# Patient Record
Sex: MALE | Race: BLACK OR AFRICAN AMERICAN | Employment: UNEMPLOYED | URBAN - METROPOLITAN AREA
[De-identification: names, ages, dates, MRNs, and addresses within clinical notes are randomized per-mention and may not be internally consistent; named-entity substitution may affect disease eponyms.]

---

## 2017-02-07 ENCOUNTER — OFFICE VISIT (OUTPATIENT)
Dept: FAMILY MEDICINE CLINIC | Age: 47
End: 2017-02-07

## 2017-02-07 VITALS
RESPIRATION RATE: 16 BRPM | SYSTOLIC BLOOD PRESSURE: 129 MMHG | HEIGHT: 69 IN | WEIGHT: 175.5 LBS | BODY MASS INDEX: 26 KG/M2 | HEART RATE: 81 BPM | DIASTOLIC BLOOD PRESSURE: 82 MMHG | TEMPERATURE: 97.7 F

## 2017-02-07 DIAGNOSIS — L60.2 OVERGROWN TOENAILS: ICD-10-CM

## 2017-02-07 DIAGNOSIS — M19.90 ARTHRITIS: Primary | ICD-10-CM

## 2017-02-07 DIAGNOSIS — E55.9 VITAMIN D DEFICIENCY: ICD-10-CM

## 2017-02-07 DIAGNOSIS — K40.90 LEFT INGUINAL HERNIA: ICD-10-CM

## 2017-02-07 DIAGNOSIS — R63.4 WEIGHT LOSS: ICD-10-CM

## 2017-02-07 DIAGNOSIS — H61.23 BILATERAL IMPACTED CERUMEN: ICD-10-CM

## 2017-02-07 DIAGNOSIS — Z51.81 MEDICATION MONITORING ENCOUNTER: ICD-10-CM

## 2017-02-07 DIAGNOSIS — K21.9 GASTROESOPHAGEAL REFLUX DISEASE WITHOUT ESOPHAGITIS: ICD-10-CM

## 2017-02-07 DIAGNOSIS — K42.9 UMBILICAL HERNIA WITHOUT OBSTRUCTION AND WITHOUT GANGRENE: ICD-10-CM

## 2017-02-07 RX ORDER — OMEPRAZOLE 20 MG/1
CAPSULE, DELAYED RELEASE ORAL
Qty: 30 CAP | Refills: 3 | Status: SHIPPED | OUTPATIENT
Start: 2017-02-07 | End: 2017-03-15 | Stop reason: SDUPTHER

## 2017-02-07 RX ORDER — OXYCODONE AND ACETAMINOPHEN 5; 325 MG/1; MG/1
1 TABLET ORAL
Qty: 30 TAB | Refills: 0 | Status: SHIPPED | OUTPATIENT
Start: 2017-02-07 | End: 2017-05-10 | Stop reason: SDUPTHER

## 2017-02-07 RX ORDER — OXYCODONE AND ACETAMINOPHEN 5; 325 MG/1; MG/1
1 TABLET ORAL
COMMUNITY
End: 2017-02-07 | Stop reason: SDUPTHER

## 2017-02-07 RX ORDER — ERGOCALCIFEROL 1.25 MG/1
50000 CAPSULE ORAL
Qty: 4 CAP | Refills: 3 | Status: SHIPPED | OUTPATIENT
Start: 2017-02-07 | End: 2017-03-13 | Stop reason: SDUPTHER

## 2017-02-07 NOTE — MR AVS SNAPSHOT
Visit Information Date & Time Provider Department Dept. Phone Encounter #  
 2/7/2017  9:00 AM Chante PiersonAjith 70 [phone] Follow-up Instructions Return in about 3 months (around 5/7/2017). Upcoming Health Maintenance Date Due Pneumococcal 19-64 Medium Risk (1 of 1 - PPSV23) 11/11/1991 INFLUENZA AGE 9 TO ADULT 8/1/2016 COLONOSCOPY 12/12/2024 DTaP/Tdap/Td series (2 - Td) 8/23/2026 Allergies as of 2/7/2017  Review Complete On: 2/7/2017 By: Chante Pierson MD  
  
 Severity Noted Reaction Type Reactions Protonix [Pantoprazole]  04/21/2015    Palpitations Current Immunizations  Never Reviewed No immunizations on file. Not reviewed this visit You Were Diagnosed With   
  
 Codes Comments Arthritis    -  Primary ICD-10-CM: M19.90 ICD-9-CM: 716.90 Gastroesophageal reflux disease without esophagitis     ICD-10-CM: K21.9 ICD-9-CM: 530.81 Vitamin D deficiency     ICD-10-CM: E55.9 ICD-9-CM: 268.9 Bilateral impacted cerumen     ICD-10-CM: H61.23 
ICD-9-CM: 380.4 Weight loss     ICD-10-CM: R63.4 ICD-9-CM: 783.21 Umbilical hernia without obstruction and without gangrene     ICD-10-CM: K42.9 ICD-9-CM: 553.1 Left inguinal hernia     ICD-10-CM: K40.90 ICD-9-CM: 550.90 Medication monitoring encounter     ICD-10-CM: Z51.81 
ICD-9-CM: V58.83 Vitals BP Pulse Temp Resp Height(growth percentile) Weight(growth percentile) 129/82 (BP 1 Location: Right arm, BP Patient Position: Sitting) 81 97.7 °F (36.5 °C) (Oral) 16 5' 9\" (1.753 m) 175 lb 8 oz (79.6 kg) BMI Smoking Status 25.92 kg/m2 Current Every Day Smoker BMI and BSA Data Body Mass Index Body Surface Area  
 25.92 kg/m 2 1.97 m 2 Preferred Pharmacy Pharmacy Name Phone [id number] [address] [phone] Your Updated Medication List  
  
 This list is accurate as of: 2/7/17 10:31 AM.  Always use your most recent med list.  
  
  
  
  
 desonide 0.05 % cream  
Commonly known as:  Mike Mulch Apply  to affected area two (2) times a day. ergocalciferol 50,000 unit capsule Commonly known as:  ERGOCALCIFEROL Take 1 Cap by mouth every seven (7) days. omeprazole 20 mg capsule Commonly known as:  PRILOSEC  
TAKE ONE CAPSULE BY MOUTH EVERY DAY  
  
 oxyCODONE-acetaminophen 5-325 mg per tablet Commonly known as:  PERCOCET Take 1 Tab by mouth every eight (8) hours as needed for Pain. Max Daily Amount: 3 Tabs. Prescriptions Printed Refills  
 oxyCODONE-acetaminophen (PERCOCET) 5-325 mg per tablet 0 Sig: Take 1 Tab by mouth every eight (8) hours as needed for Pain. Max Daily Amount: 3 Tabs. Class: Print Route: Oral  
  
Prescriptions Sent to Pharmacy Refills  
 omeprazole (PRILOSEC) 20 mg capsule 3 Sig: TAKE ONE CAPSULE BY MOUTH EVERY DAY Class: Normal  
 Pharmacy: [facility] [address] Ph #: [phone]  
 ergocalciferol (ERGOCALCIFEROL) 50,000 unit capsule 3 Sig: Take 1 Cap by mouth every seven (7) days. Class: Normal  
 Pharmacy: [address] [place] [address] Ph #: [phone] Route: Oral  
  
We Performed the Following REFERRAL TO ENT-OTOLARYNGOLOGY [YMR06 Custom] Comments:  
 Please evaluate patient for cerumen impaction. Follow-up Instructions Return in about 3 months (around 5/7/2017). To-Do List   
 05/07/2017 Lab:  CBC WITH AUTOMATED DIFF   
  
 05/07/2017 Lab:  MAGNESIUM   
  
 05/07/2017 Lab:  METABOLIC PANEL, COMPREHENSIVE   
  
 05/07/2017 Lab:  TSH 3RD GENERATION   
  
 05/07/2017 Lab:  VITAMIN B12   
  
 05/07/2017 Lab:  VITAMIN D, 25 HYDROXY Referral Information Referral ID Referred By Referred To [id number] [address] [place] [address] Road, [place]   
   [address] [place] [address] [address] [place], [address] Phone: [phone] Fax: [phone] Visits Status Start Date End Date 1 New Request 2/7/17 2/7/18 If your referral has a status of pending review or denied, additional information will be sent to support the outcome of this decision. Patient Instructions Please contact our office if you have any questions about your visit today. Introducing [facility]! Dear Kely Davey: Thank you for requesting a [employer] account. Our records indicate that you have previously registered for a [employer] account but its currently inactive. Please call our [employer] support line at [phone]. Additional Information If you have questions, please visit the Frequently Asked Questions section of the [employer] website at [email or web address] Remember, [employer] is NOT to be used for urgent needs. For medical emergencies, dial 911. Now available from your iPhone and Android! Please provide this summary of care documentation to your next provider. Your primary care clinician is listed as LUPE GRANT. If you have any questions after today's visit, please call [phone].

## 2017-02-07 NOTE — PROGRESS NOTES
Chief Complaint   Patient presents with    Complete Physical       Health Maintenance reviewed     1. Have you been to the ER, urgent care clinic since your last visit? Hospitalized since your last visit? No    2. Have you seen or consulted any other health care providers outside of the [address] [place] [address] since your last visit? Include any pap smears or colon screening.  No

## 2017-02-07 NOTE — PROGRESS NOTES
HISTORY OF PRESENT ILLNESS  Vicki Duvall is a 42 y.o. male. 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 Pain level 6-7/10 usually at the end of the day. complains of feeling like left ear is clogged  complains of overgrown toenails, discolored, black, not painful    HPI  Past Medical History   Diagnosis Date    GERD (gastroesophageal reflux disease)      Current Outpatient Prescriptions   Medication Sig Dispense Refill    oxyCODONE-acetaminophen (PERCOCET) 5-325 mg per tablet Take 1 Tab by mouth every eight (8) hours as needed for Pain.  omeprazole (PRILOSEC) 20 mg capsule TAKE ONE CAPSULE BY MOUTH EVERY DAY 30 Cap 3    ergocalciferol (ERGOCALCIFEROL) 50,000 unit capsule Take 1 Cap by mouth every seven (7) days. 4 Cap 3    desonide (TRIDESILON) 0.05 % cream Apply  to affected area two (2) times a day. 30 g 0     Allergies   Allergen Reactions    Protonix [Pantoprazole] Palpitations       Review of Systems   Constitutional: Negative for malaise/fatigue. HENT: Positive for ear pain. Negative for ear discharge. Gastrointestinal: Positive for heartburn. Negative for nausea and vomiting. Visit Vitals    /82 (BP 1 Location: Right arm, BP Patient Position: Sitting)    Pulse 81    Temp 97.7 °F (36.5 °C) (Oral)    Resp 16    Ht 5' 9\" (1.753 m)    Wt 175 lb 8 oz (79.6 kg)    BMI 25.92 kg/m2       Physical Exam  Nursing note and vitals reviewed. Constitutional: He is oriented to person, place, and time. He appears well-developed and well-nourished. No distress. HENT:   Mouth/Throat: Oropharynx is clear and moist.   Neck: No JVD present. No thyromegaly present.    Cardiovascular: Normal rate, regular rhythm and normal heart sounds. Pulmonary/Chest: Effort normal and breath sounds normal. No respiratory distress. He has no wheezes. He has no rales. Abdominal: Soft. Bowel sounds are normal. He exhibits no distension. There is no tenderness. There is no rebound. Musculoskeletal: He exhibits no edema and no tenderness. Lymphadenopathy:     He has no cervical adenopathy. Neurological: He is alert and oriented to person, place, and time. Skin: No pallor. Psychiatric: He has a normal mood and affect. His behavior is normal.    Results for orders placed or performed during the hospital encounter of 07/20/16   LIPID PANEL   Result Value Ref Range    LIPID PROFILE          Cholesterol, total 121 <200 MG/DL    Triglyceride 56 <150 MG/DL    HDL Cholesterol 55 40 - 60 MG/DL    LDL, calculated 54.8 0 - 100 MG/DL    VLDL, calculated 11.2 MG/DL    CHOL/HDL Ratio 2.2 0 - 5.0     METABOLIC PANEL, BASIC   Result Value Ref Range    Sodium 142 136 - 145 mmol/L    Potassium 4.5 3.5 - 5.5 mmol/L    Chloride 107 100 - 108 mmol/L    CO2 28 21 - 32 mmol/L    Anion gap 7 3.0 - 18 mmol/L    Glucose 91 74 - 99 mg/dL    BUN 9 7.0 - 18 MG/DL    Creatinine 0.91 0.6 - 1.3 MG/DL    BUN/Creatinine ratio 10 (L) 12 - 20      GFR est AA >60 >60 ml/min/1.73m2    GFR est non-AA >60 >60 ml/min/1.73m2    Calcium 8.9 8.5 - 10.1 MG/DL   VITAMIN B12   Result Value Ref Range    Vitamin B12 515 211 - 911 pg/mL   MAGNESIUM   Result Value Ref Range    Magnesium 2.3 1.8 - 2.4 mg/dL   VITAMIN D, 25 HYDROXY   Result Value Ref Range    Vitamin D 25-Hydroxy 21.1 (L) 30 - 100 ng/mL   CELIAC ANTIBODY PROFILE   Result Value Ref Range    Immunoglobulin A, Qt. 188 90 - 386 mg/dL    Deamidated Gliadin Ab, IgA 3 0 - 19 units    Deamidated Gliadin Ab, IgG 1 0 - 19 units    t-Transglutaminase, IgA <2 0 - 3 U/mL    t-Transglutaminase, IgG 2 0 - 5 U/mL       ASSESSMENT and PLAN    ICD-10-CM ICD-9-CM    1.  Arthritis M19.90 716.90 oxyCODONE-acetaminophen (PERCOCET) 5-325 mg per tablet      CBC WITH AUTOMATED DIFF      METABOLIC PANEL, COMPREHENSIVE      TSH 3RD GENERATION      MAGNESIUM   2. Gastroesophageal reflux disease without esophagitis K21.9 530.81 omeprazole (PRILOSEC) 20 mg capsule      CBC WITH AUTOMATED DIFF      METABOLIC PANEL, COMPREHENSIVE      VITAMIN B12      TSH 3RD GENERATION      MAGNESIUM   3. Vitamin D deficiency E55.9 268.9 ergocalciferol (ERGOCALCIFEROL) 50,000 unit capsule      CBC WITH AUTOMATED DIFF      METABOLIC PANEL, COMPREHENSIVE      TSH 3RD GENERATION      MAGNESIUM      VITAMIN D, 25 HYDROXY   4. Bilateral impacted cerumen H61.23 380.4 REFERRAL TO ENT-OTOLARYNGOLOGY   5. Weight loss R63.4 783.21 CBC WITH AUTOMATED DIFF      METABOLIC PANEL, COMPREHENSIVE      TSH 3RD GENERATION      MAGNESIUM   6. Umbilical hernia without obstruction and without gangrene K42.9 553.1    7. Left inguinal hernia K40.90 550.90    8. Overgrown toenails L60.2 703.8 REFERRAL TO PODIATRY   9. Medication monitoring encounter Z51.81 V58.83 CBC WITH AUTOMATED DIFF      METABOLIC PANEL, COMPREHENSIVE      VITAMIN B12      TSH 3RD GENERATION      MAGNESIUM      VITAMIN D, 25 HYDROXY   Follow-up Disposition:  Return in about 3 months (around 5/7/2017).

## 2017-02-07 NOTE — PROGRESS NOTES
Subjective:   Patricia Villareal is a 42 y.o. male presenting for his annual checkup. ROS:  Feeling well. No dyspnea or chest pain on exertion. No abdominal pain, change in bowel habits, black or bloody stools. No urinary tract or prostatic symptoms. No neurological complaints. Specific concerns today: ***. Current Outpatient Prescriptions   Medication Sig Dispense Refill    oxyCODONE-acetaminophen (PERCOCET) 5-325 mg per tablet Take 1 Tab by mouth every eight (8) hours as needed for Pain.  omeprazole (PRILOSEC) 20 mg capsule TAKE ONE CAPSULE BY MOUTH EVERY DAY 30 Cap 3    ergocalciferol (ERGOCALCIFEROL) 50,000 unit capsule Take 1 Cap by mouth every seven (7) days. 4 Cap 3    desonide (TRIDESILON) 0.05 % cream Apply  to affected area two (2) times a day. 30 g 0     Allergies   Allergen Reactions    Protonix [Pantoprazole] Palpitations     Past Medical History   Diagnosis Date    GERD (gastroesophageal reflux disease)      Past Surgical History   Procedure Laterality Date    Hx hernia repair Left      Family History   Problem Relation Age of Onset    Diabetes Mother     Blindness Sister     Other Sister      menningitis     Social History   Substance Use Topics    Smoking status: Current Every Day Smoker     Packs/day: 0.50     Years: 20.00    Smokeless tobacco: Never Used    Alcohol use No             Objective:     Visit Vitals    /82 (BP 1 Location: Right arm, BP Patient Position: Sitting)    Pulse 81    Temp 97.7 °F (36.5 °C) (Oral)    Resp 16    Ht 5' 9\" (1.753 m)    Wt 175 lb 8 oz (79.6 kg)    BMI 25.92 kg/m2     The patient appears well, alert, oriented x 3, in no distress. ENT normal.  Neck supple. No adenopathy or thyromegaly. MOHSEN. Lungs are clear, good air entry, no wheezes, rhonchi or rales. S1 and S2 normal, no murmurs, regular rate and rhythm. Abdomen is soft without tenderness, guarding, mass or organomegaly.   exam: {pe male genitalia:386916::\"no penile lesions or discharge, no testicular masses or tenderness, no hernias\"}. Extremities show no edema, normal peripheral pulses. Neurological is normal without focal findings. Assessment/Plan:   healthy adult male  {disease follow up plans:750709}. {Assessment and Plan:22329}.

## 2017-03-13 DIAGNOSIS — E55.9 VITAMIN D DEFICIENCY: ICD-10-CM

## 2017-03-13 RX ORDER — ERGOCALCIFEROL 1.25 MG/1
50000 CAPSULE ORAL
Qty: 12 CAP | Refills: 1 | Status: SHIPPED | OUTPATIENT
Start: 2017-03-13 | End: 2017-09-26 | Stop reason: SDUPTHER

## 2017-03-15 DIAGNOSIS — K21.9 GASTROESOPHAGEAL REFLUX DISEASE WITHOUT ESOPHAGITIS: ICD-10-CM

## 2017-03-16 RX ORDER — OMEPRAZOLE 20 MG/1
CAPSULE, DELAYED RELEASE ORAL
Qty: 90 CAP | Refills: 3 | Status: SHIPPED | OUTPATIENT
Start: 2017-03-16 | End: 2017-09-26 | Stop reason: SDUPTHER

## 2017-05-09 ENCOUNTER — OFFICE VISIT (OUTPATIENT)
Dept: FAMILY MEDICINE CLINIC | Age: 47
End: 2017-05-09

## 2017-05-09 VITALS
DIASTOLIC BLOOD PRESSURE: 87 MMHG | HEIGHT: 69 IN | TEMPERATURE: 97.9 F | BODY MASS INDEX: 26.36 KG/M2 | OXYGEN SATURATION: 99 % | HEART RATE: 69 BPM | SYSTOLIC BLOOD PRESSURE: 147 MMHG | WEIGHT: 178 LBS

## 2017-05-09 DIAGNOSIS — M19.90 ARTHRITIS: ICD-10-CM

## 2017-05-09 DIAGNOSIS — R51.9 NONINTRACTABLE HEADACHE, UNSPECIFIED CHRONICITY PATTERN, UNSPECIFIED HEADACHE TYPE: ICD-10-CM

## 2017-05-09 DIAGNOSIS — E55.9 VITAMIN D DEFICIENCY: ICD-10-CM

## 2017-05-09 DIAGNOSIS — R03.0 ELEVATED BLOOD PRESSURE READING: ICD-10-CM

## 2017-05-09 DIAGNOSIS — K21.9 GASTROESOPHAGEAL REFLUX DISEASE, ESOPHAGITIS PRESENCE NOT SPECIFIED: Primary | ICD-10-CM

## 2017-05-09 RX ORDER — OXYCODONE AND ACETAMINOPHEN 5; 325 MG/1; MG/1
1 TABLET ORAL
Qty: 30 TAB | Refills: 0 | Status: CANCELLED | OUTPATIENT
Start: 2017-05-09

## 2017-05-09 NOTE — TELEPHONE ENCOUNTER
Pt was seen today and had requested a refill on Percocet, but he did not get a prescription for this.

## 2017-05-09 NOTE — MR AVS SNAPSHOT
Visit Information Date & Time Provider Department Dept. Phone Encounter #  
 5/9/2017 11:30 AM Dayo Landin NP [address] [id number] Follow-up Instructions Return in about 1 day (around 5/10/2017), or if symptoms worsen or fail to improve. Upcoming Health Maintenance Date Due Pneumococcal 19-64 Medium Risk (1 of 1 - PPSV23) 11/11/1991 INFLUENZA AGE 9 TO ADULT 8/1/2017 COLONOSCOPY 12/12/2024 DTaP/Tdap/Td series (2 - Td) 8/23/2026 Allergies as of 5/9/2017  Review Complete On: 5/9/2017 By: Reji Boss LPN Severity Noted Reaction Type Reactions Protonix [Pantoprazole]  04/21/2015    Palpitations Current Immunizations  Never Reviewed No immunizations on file. Not reviewed this visit You Were Diagnosed With   
  
 Codes Comments Gastroesophageal reflux disease, esophagitis presence not specified    -  Primary ICD-10-CM: K21.9 ICD-9-CM: 530.81 Vitamin D deficiency     ICD-10-CM: E55.9 ICD-9-CM: 268.9 Elevated blood pressure reading     ICD-10-CM: R03.0 ICD-9-CM: 796.2 Nonintractable headache, unspecified chronicity pattern, unspecified headache type     ICD-10-CM: R51 ICD-9-CM: 567. 0 Vitals BP Pulse Temp Height(growth percentile) Weight(growth percentile) SpO2  
 147/87 (BP 1 Location: Right arm, BP Patient Position: Sitting) 69 97.9 °F (36.6 °C) (Oral) 5' 9\" (1.753 m) 178 lb (80.7 kg) 99% BMI Smoking Status 26.29 kg/m2 Current Every Day Smoker BMI and BSA Data Body Mass Index Body Surface Area  
 26.29 kg/m 2 1.98 m 2 Preferred Pharmacy Pharmacy Name Phone [phone] [phone] [address] [phone] Your Updated Medication List  
  
   
This list is accurate as of: 5/9/17 12:14 PM.  Always use your most recent med list.  
  
  
  
  
 desonide 0.05 % cream  
Commonly known as:  Yonathan Mota  
 Apply  to affected area two (2) times a day. ergocalciferol 50,000 unit capsule Commonly known as:  ERGOCALCIFEROL Take 1 Cap by mouth every seven (7) days. omeprazole 20 mg capsule Commonly known as:  PRILOSEC  
TAKE ONE CAPSULE BY MOUTH EVERY DAY  
  
 oxyCODONE-acetaminophen 5-325 mg per tablet Commonly known as:  PERCOCET Take 1 Tab by mouth every eight (8) hours as needed for Pain. Max Daily Amount: 3 Tabs. Follow-up Instructions Return in about 1 day (around 5/10/2017), or if symptoms worsen or fail to improve. Introducing [facility]! Dear Main Chavez: Thank you for requesting a [employer] account. Our records indicate that you have previously registered for a [employer] account but its currently inactive. Please call our [employer] support line at [phone]. Additional Information If you have questions, please visit the Frequently Asked Questions section of the [employer] website at [email or web address] Remember, [employer] is NOT to be used for urgent needs. For medical emergencies, dial 911. Now available from your iPhone and Android! Please provide this summary of care documentation to your next provider. Your primary care clinician is listed as LUPE GRANT. If you have any questions after today's visit, please call [phone].

## 2017-05-09 NOTE — PROGRESS NOTES
1. Have you been to the ER, urgent care clinic since your last visit? Hospitalized since your last visit? No    2. Have you seen or consulted any other health care providers outside of the [facility] since your last visit? Include any pap smears or colon screening.  No    Is someone accompanying this pt? no    Is the patient using any DME equipment during OV? no      Chief Complaint   Patient presents with    GERD    Vitamin D Deficiency

## 2017-05-09 NOTE — PROGRESS NOTES
MYLES Ramsey is a 42 y.o. male  Chief Complaint   Patient presents with    GERD    Vitamin D Deficiency     Reports taking medication for GERD and it is effective. Denies issues at this time with GERD. Reports needing to have his labs completed for vitamin D deficiency. Reports taking vitamin D once weekly as previously prescribed by Dr. Rosalia Meza. Reports mild headache. 1-2/10 dull headache. Admits to smoking just prior to office visit. Past Medical History  Past Medical History:   Diagnosis Date    GERD (gastroesophageal reflux disease)        Surgical History  Past Surgical History:   Procedure Laterality Date    HX HERNIA REPAIR Left         Medications  Current Outpatient Prescriptions   Medication Sig Dispense Refill    omeprazole (PRILOSEC) 20 mg capsule TAKE ONE CAPSULE BY MOUTH EVERY DAY 90 Cap 3    ergocalciferol (ERGOCALCIFEROL) 50,000 unit capsule Take 1 Cap by mouth every seven (7) days. 12 Cap 1    oxyCODONE-acetaminophen (PERCOCET) 5-325 mg per tablet Take 1 Tab by mouth every eight (8) hours as needed for Pain. Max Daily Amount: 3 Tabs. 30 Tab 0    desonide (TRIDESILON) 0.05 % cream Apply  to affected area two (2) times a day. 30 g 0       Allergies  Allergies   Allergen Reactions    Protonix [Pantoprazole] Palpitations       Family History  Family History   Problem Relation Age of Onset    Diabetes Mother    [facility] Blindness Sister     Other Sister      menningitis       Social History  Social History     Social History    Marital status: SINGLE     Spouse name: N/A    Number of children: N/A    Years of education: N/A     Occupational History    Not on file.      Social History Main Topics    Smoking status: Current Every Day Smoker     Packs/day: 0.50     Years: 20.00    Smokeless tobacco: Never Used    Alcohol use No    Drug use: No    Sexual activity: Yes     Partners: Female     Other Topics Concern    Not on file     Social History Narrative       Problem List  Patient Active Problem List   Diagnosis Code    Gastroesophageal reflux disease without esophagitis K21.9    Left inguinal hernia U41.27    Umbilical hernia without obstruction and without gangrene K42.9    Vitamin D deficiency E55.9       Review of Systems  Review of Systems   Constitutional: Negative for chills and fever. Respiratory: Negative for shortness of breath. Cardiovascular: Negative for chest pain and palpitations. Gastrointestinal: Negative for abdominal pain, diarrhea, nausea and vomiting. Musculoskeletal: Negative for falls, joint pain and myalgias. Neurological: Positive for headaches. Vital Signs  Vitals:    05/09/17 1142   BP: 147/87   Pulse: 69   Temp: 97.9 °F (36.6 °C)   TempSrc: Oral   SpO2: 99%   Weight: 178 lb (80.7 kg)   Height: 5' 9\" (1.753 m)   PainSc:   0 - No pain       Physical Exam  Physical Exam   Constitutional: He is oriented to person, place, and time. Cardiovascular: Normal rate, regular rhythm and normal heart sounds. Exam reveals no gallop and no friction rub. No murmur heard. Pulmonary/Chest: Effort normal and breath sounds normal. No respiratory distress. He has no wheezes. He has no rales. Neurological: He is alert and oriented to person, place, and time. Skin: Skin is warm and dry. Psychiatric: He has a normal mood and affect. His behavior is normal.       Diagnostics  No orders of the defined types were placed in this encounter.       Results  Results for orders placed or performed during the hospital encounter of 07/20/16   LIPID PANEL   Result Value Ref Range    LIPID PROFILE          Cholesterol, total 121 <200 MG/DL    Triglyceride 56 <150 MG/DL    HDL Cholesterol 55 40 - 60 MG/DL    LDL, calculated 54.8 0 - 100 MG/DL    VLDL, calculated 11.2 MG/DL    CHOL/HDL Ratio 2.2 0 - 5.0     METABOLIC PANEL, BASIC   Result Value Ref Range    Sodium 142 136 - 145 mmol/L    Potassium 4.5 3.5 - 5.5 mmol/L    Chloride 107 100 - 108 mmol/L    CO2 28 21 - 32 mmol/L    Anion gap 7 3.0 - 18 mmol/L    Glucose 91 74 - 99 mg/dL    BUN 9 7.0 - 18 MG/DL    Creatinine 0.91 0.6 - 1.3 MG/DL    BUN/Creatinine ratio 10 (L) 12 - 20      GFR est AA >60 >60 ml/min/1.73m2    GFR est non-AA >60 >60 ml/min/1.73m2    Calcium 8.9 8.5 - 10.1 MG/DL   VITAMIN B12   Result Value Ref Range    Vitamin B12 515 211 - 911 pg/mL   MAGNESIUM   Result Value Ref Range    Magnesium 2.3 1.8 - 2.4 mg/dL   VITAMIN D, 25 HYDROXY   Result Value Ref Range    Vitamin D 25-Hydroxy 21.1 (L) 30 - 100 ng/mL   CELIAC ANTIBODY PROFILE   Result Value Ref Range    Immunoglobulin A, Qt. 188 90 - 386 mg/dL    Deamidated Gliadin Ab, IgA 3 0 - 19 units    Deamidated Gliadin Ab, IgG 1 0 - 19 units    t-Transglutaminase, IgA <2 0 - 3 U/mL    t-Transglutaminase, IgG 2 0 - 5 U/mL         Assessment and Plan  Carolann Jimenez was seen today for gerd and vitamin d deficiency. Diagnoses and all orders for this visit:    Gastroesophageal reflux disease, esophagitis presence not specified    Vitamin D deficiency    Elevated blood pressure reading    Nonintractable headache, unspecified chronicity pattern, unspecified headache type    Other orders  -     Cancel: oxyCODONE-acetaminophen (PERCOCET) 5-325 mg per tablet; Take 1 Tab by mouth every eight (8) hours as needed for Pain. Max Daily Amount: 3 Tabs. Discussed importance of smoking cessation and the impact on blood pressure. Patient to return for fasting labs and then to follow up with Dr. Tristen Patel  Patient to continue taking Vitamin D as prescribed. Patient to take OTC acetaminophen for headache  After care summary printed and reviewed with patient. Plan reviewed with patient. Questions answered. Patient verbalized understanding of plan and is in agreement with plan. Patient to follow up in one week or earlier if symptoms worsen for lab results and blood pressure check with PCP.      LUIS E Montanez

## 2017-05-10 ENCOUNTER — DOCUMENTATION ONLY (OUTPATIENT)
Dept: FAMILY MEDICINE CLINIC | Age: 47
End: 2017-05-10

## 2017-05-10 ENCOUNTER — HOSPITAL ENCOUNTER (OUTPATIENT)
Dept: LAB | Age: 47
Discharge: HOME | End: 2017-05-10
Payer: COMMERCIAL

## 2017-05-10 DIAGNOSIS — R63.4 WEIGHT LOSS: ICD-10-CM

## 2017-05-10 DIAGNOSIS — E55.9 VITAMIN D DEFICIENCY: ICD-10-CM

## 2017-05-10 DIAGNOSIS — K21.9 GASTROESOPHAGEAL REFLUX DISEASE WITHOUT ESOPHAGITIS: ICD-10-CM

## 2017-05-10 DIAGNOSIS — Z51.81 MEDICATION MONITORING ENCOUNTER: ICD-10-CM

## 2017-05-10 DIAGNOSIS — M19.90 ARTHRITIS: ICD-10-CM

## 2017-05-10 LAB
ALBUMIN SERPL BCP-MCNC: 3.7 G/DL (ref 3.4–5)
ALBUMIN/GLOB SERPL: 1 {RATIO} (ref 0.8–1.7)
ALP SERPL-CCNC: 99 U/L (ref 45–117)
ALT SERPL-CCNC: 18 U/L (ref 16–61)
ANION GAP BLD CALC-SCNC: 9 MMOL/L (ref 3–18)
AST SERPL W P-5'-P-CCNC: 13 U/L (ref 15–37)
BASOPHILS # BLD AUTO: 0 K/UL (ref 0–0.06)
BASOPHILS # BLD: 1 % (ref 0–2)
BILIRUB SERPL-MCNC: 0.3 MG/DL (ref 0.2–1)
BUN SERPL-MCNC: 10 MG/DL (ref 7–18)
BUN/CREAT SERPL: 11 (ref 12–20)
CALCIUM SERPL-MCNC: 8.8 MG/DL (ref 8.5–10.1)
CHLORIDE SERPL-SCNC: 104 MMOL/L (ref 100–108)
CO2 SERPL-SCNC: 28 MMOL/L (ref 21–32)
CREAT SERPL-MCNC: 0.95 MG/DL (ref 0.6–1.3)
DIFFERENTIAL METHOD BLD: ABNORMAL
EOSINOPHIL # BLD: 0.5 K/UL (ref 0–0.4)
EOSINOPHIL NFR BLD: 9 % (ref 0–5)
ERYTHROCYTE [DISTWIDTH] IN BLOOD BY AUTOMATED COUNT: 12.7 % (ref 11.6–14.5)
GLOBULIN SER CALC-MCNC: 3.6 G/DL (ref 2–4)
GLUCOSE SERPL-MCNC: 95 MG/DL (ref 74–99)
HCT VFR BLD AUTO: 44.3 % (ref 36–48)
HGB BLD-MCNC: 14.4 G/DL (ref 13–16)
LYMPHOCYTES # BLD AUTO: 45 % (ref 21–52)
LYMPHOCYTES # BLD: 2.6 K/UL (ref 0.9–3.6)
MAGNESIUM SERPL-MCNC: 2.2 MG/DL (ref 1.6–2.6)
MCH RBC QN AUTO: 30.7 PG (ref 24–34)
MCHC RBC AUTO-ENTMCNC: 32.5 G/DL (ref 31–37)
MCV RBC AUTO: 94.5 FL (ref 74–97)
MONOCYTES # BLD: 0.5 K/UL (ref 0.05–1.2)
MONOCYTES NFR BLD AUTO: 9 % (ref 3–10)
NEUTS SEG # BLD: 2.1 K/UL (ref 1.8–8)
NEUTS SEG NFR BLD AUTO: 36 % (ref 40–73)
PLATELET # BLD AUTO: 251 K/UL (ref 135–420)
PMV BLD AUTO: 11.3 FL (ref 9.2–11.8)
POTASSIUM SERPL-SCNC: 4.6 MMOL/L (ref 3.5–5.5)
PROT SERPL-MCNC: 7.3 G/DL (ref 6.4–8.2)
RBC # BLD AUTO: 4.69 M/UL (ref 4.7–5.5)
SODIUM SERPL-SCNC: 141 MMOL/L (ref 136–145)
TSH SERPL DL<=0.05 MIU/L-ACNC: 1.98 UIU/ML (ref 0.36–3.74)
VIT B12 SERPL-MCNC: 572 PG/ML (ref 211–911)
WBC # BLD AUTO: 5.8 K/UL (ref 4.6–13.2)

## 2017-05-10 PROCEDURE — 80053 COMPREHEN METABOLIC PANEL: CPT | Performed by: FAMILY MEDICINE

## 2017-05-10 PROCEDURE — 82607 VITAMIN B-12: CPT | Performed by: FAMILY MEDICINE

## 2017-05-10 PROCEDURE — 85025 COMPLETE CBC W/AUTO DIFF WBC: CPT | Performed by: FAMILY MEDICINE

## 2017-05-10 PROCEDURE — 84443 ASSAY THYROID STIM HORMONE: CPT | Performed by: FAMILY MEDICINE

## 2017-05-10 PROCEDURE — 36415 COLL VENOUS BLD VENIPUNCTURE: CPT | Performed by: FAMILY MEDICINE

## 2017-05-10 PROCEDURE — 83735 ASSAY OF MAGNESIUM: CPT | Performed by: FAMILY MEDICINE

## 2017-05-10 PROCEDURE — 82306 VITAMIN D 25 HYDROXY: CPT | Performed by: FAMILY MEDICINE

## 2017-05-10 RX ORDER — OXYCODONE AND ACETAMINOPHEN 5; 325 MG/1; MG/1
1 TABLET ORAL
Qty: 30 TAB | Refills: 0 | Status: SHIPPED | OUTPATIENT
Start: 2017-05-10 | End: 2017-09-26 | Stop reason: SDUPTHER

## 2017-05-11 LAB — 25(OH)D3 SERPL-MCNC: 36.4 NG/ML (ref 30–100)

## 2017-09-26 ENCOUNTER — OFFICE VISIT (OUTPATIENT)
Dept: FAMILY MEDICINE CLINIC | Age: 47
End: 2017-09-26

## 2017-09-26 ENCOUNTER — HOSPITAL ENCOUNTER (OUTPATIENT)
Dept: LAB | Age: 47
Discharge: HOME | End: 2017-09-26
Payer: COMMERCIAL

## 2017-09-26 VITALS
BODY MASS INDEX: 27.11 KG/M2 | TEMPERATURE: 97.8 F | HEIGHT: 69 IN | RESPIRATION RATE: 20 BRPM | SYSTOLIC BLOOD PRESSURE: 129 MMHG | HEART RATE: 73 BPM | WEIGHT: 183 LBS | DIASTOLIC BLOOD PRESSURE: 82 MMHG

## 2017-09-26 DIAGNOSIS — K21.9 GASTROESOPHAGEAL REFLUX DISEASE WITHOUT ESOPHAGITIS: ICD-10-CM

## 2017-09-26 DIAGNOSIS — M19.90 ARTHRITIS: ICD-10-CM

## 2017-09-26 DIAGNOSIS — R22.31 NODULE OF FINGER, RIGHT: ICD-10-CM

## 2017-09-26 DIAGNOSIS — G89.4 CHRONIC PAIN SYNDROME: Primary | ICD-10-CM

## 2017-09-26 DIAGNOSIS — R20.0 NUMBNESS OF FINGERS: ICD-10-CM

## 2017-09-26 DIAGNOSIS — E55.9 VITAMIN D DEFICIENCY: ICD-10-CM

## 2017-09-26 DIAGNOSIS — Z51.81 ENCOUNTER FOR MONITORING OPIOID MAINTENANCE THERAPY: ICD-10-CM

## 2017-09-26 DIAGNOSIS — Z79.891 ENCOUNTER FOR MONITORING OPIOID MAINTENANCE THERAPY: ICD-10-CM

## 2017-09-26 PROCEDURE — 80307 DRUG TEST PRSMV CHEM ANLYZR: CPT | Performed by: FAMILY MEDICINE

## 2017-09-26 RX ORDER — ERGOCALCIFEROL 1.25 MG/1
50000 CAPSULE ORAL
Qty: 12 CAP | Refills: 1 | Status: SHIPPED | OUTPATIENT
Start: 2017-09-26 | End: 2019-09-25 | Stop reason: SDUPTHER

## 2017-09-26 RX ORDER — OMEPRAZOLE 20 MG/1
CAPSULE, DELAYED RELEASE ORAL
Qty: 90 CAP | Refills: 3 | Status: SHIPPED | OUTPATIENT
Start: 2017-09-26 | End: 2019-09-23 | Stop reason: SDUPTHER

## 2017-09-26 RX ORDER — OXYCODONE AND ACETAMINOPHEN 5; 325 MG/1; MG/1
1 TABLET ORAL
Qty: 30 TAB | Refills: 0 | Status: SHIPPED | OUTPATIENT
Start: 2017-09-26 | End: 2019-09-20

## 2017-09-26 NOTE — PROGRESS NOTES
Chief Complaint   Patient presents with    GERD    Vitamin D Deficiency    Elevated Blood Pressure       Health Maintenance Due   Topic Date Due    Pneumococcal 19-64 Medium Risk (1 of 1 - PPSV23) 11/11/1991    INFLUENZA AGE 9 TO ADULT  08/01/2017       Health Maintenance reviewed     1. Have you been to the ER, urgent care clinic since your last visit? Hospitalized since your last visit? No    2. Have you seen or consulted any other health care providers outside of the [address] [place] [address] since your last visit? Include any pap smears or colon screening.  No

## 2017-09-26 NOTE — PROGRESS NOTES
HISTORY OF PRESENT ILLNESS  Caron Lauren is a 42 y.o. male. Chief Complaint   Patient presents with    GERD chronic problem, stable improved with med    Vitamin D Deficiency    Elevated Blood Pressure    Numbness     right hand, off and on, new problem worse with playing games or laptop   Requesting med for chronic arthralgias and pain in hands back etc    HPI  Past Medical History:   Diagnosis Date    GERD (gastroesophageal reflux disease)      Current Outpatient Prescriptions   Medication Sig Dispense Refill    oxyCODONE-acetaminophen (PERCOCET) 5-325 mg per tablet Take 1 Tab by mouth every eight (8) hours as needed for Pain. Max Daily Amount: 3 Tabs. 30 Tab 0    omeprazole (PRILOSEC) 20 mg capsule TAKE ONE CAPSULE BY MOUTH EVERY DAY 90 Cap 3    ergocalciferol (ERGOCALCIFEROL) 50,000 unit capsule Take 1 Cap by mouth every seven (7) days. 12 Cap 1     Allergies   Allergen Reactions    Protonix [Pantoprazole] Palpitations       Review of Systems   Gastrointestinal: Positive for heartburn. Neurological: Positive for tingling. Negative for tremors and focal weakness. Visit Vitals    /82 (BP 1 Location: Right arm, BP Patient Position: Sitting)    Pulse 73    Temp 97.8 °F (36.6 °C) (Oral)    Resp 20    Ht 5' 9\" (1.753 m)    Wt 183 lb (83 kg)    BMI 27.02 kg/m2       Physical Exam  Nursing note and vitals reviewed. Constitutional: He is oriented to person, place, and time. He appears well-developed and well-nourished. No distress. HENT:   Mouth/Throat: Oropharynx is clear and moist.   Neck: No JVD present. No thyromegaly present. Cardiovascular: Normal rate, regular rhythm and normal heart sounds. Pulmonary/Chest: Effort normal and breath sounds normal. No respiratory distress. He has no wheezes. He has no rales. Abdominal: Soft. Bowel sounds are normal. He exhibits no distension. There is no tenderness. There is no rebound.    Musculoskeletal: He exhibits no edema and no tenderness. Lymphadenopathy:     He has no cervical adenopathy. Neurological: He is alert and oriented to person, place, and time. Skin: No pallor. Psychiatric: He has a normal mood and affect. His behavior is normal.     ASSESSMENT and PLAN    ICD-10-CM ICD-9-CM    1. Chronic pain syndrome Reviewed  database profile, no unexpected activity. Signed narcotic contract on chart. Patient remains compliant with med, refilled today. G89.4 338.4 COMPLIANCE DRUG SCREEN/PRESCRIPTION MONITORING      COMPLIANCE DRUG SCREEN/PRESCRIPTION MONITORING   2. Arthritis M19.90 716.90 oxyCODONE-acetaminophen (PERCOCET) 5-325 mg per tablet      COMPLIANCE DRUG SCREEN/PRESCRIPTION MONITORING      COMPLIANCE DRUG SCREEN/PRESCRIPTION MONITORING   3. Gastroesophageal reflux disease without esophagitis K21.9 530.81 omeprazole (PRILOSEC) 20 mg capsule   4. Vitamin D deficiency E55.9 268.9 ergocalciferol (ERGOCALCIFEROL) 50,000 unit capsule   5. Nodule of finger, right R22.31 782. 2 XR HAND RT AP/LAT   6. Numbness of fingers R20.0 782.0 REFERRAL TO NEUROLOGY   7. Encounter for monitoring opioid maintenance therapy Z51.81 V58.83 COMPLIANCE DRUG SCREEN/PRESCRIPTION MONITORING    Z79.891 V58.69 COMPLIANCE DRUG SCREEN/PRESCRIPTION MONITORING   . Visit based of time 45 minutes total,  with more than 50% of the face-to-face visit time spent in counseling on chronic issues above NEW PAIN CONTRACT, NSAIDS AVOIDED DUE TO GERD, new pain in fingers and likely overuse, it's treatment, prognosis, management advise, plan and follow-up recommendations.

## 2017-09-26 NOTE — MR AVS SNAPSHOT
Visit Information Date & Time Provider Department Dept. Phone Encounter #  
 9/26/2017 10:30 AM Kyle Priest MD [id number] [id number] Follow-up Instructions Return in about 2 months (around 11/26/2017). Upcoming Health Maintenance Date Due Pneumococcal 19-64 Medium Risk (1 of 1 - PPSV23) 11/11/1991 COLONOSCOPY 12/12/2024 DTaP/Tdap/Td series (2 - Td) 8/23/2026 Allergies as of 9/26/2017  Review Complete On: 9/26/2017 By: Kyle Priest MD  
  
 Severity Noted Reaction Type Reactions Protonix [Pantoprazole]  04/21/2015    Palpitations Current Immunizations  Never Reviewed No immunizations on file. Not reviewed this visit You Were Diagnosed With   
  
 Codes Comments Chronic pain syndrome    -  Primary ICD-10-CM: G89.4 ICD-9-CM: 338.4 Arthritis     ICD-10-CM: M19.90 ICD-9-CM: 716.90 Gastroesophageal reflux disease without esophagitis     ICD-10-CM: K21.9 ICD-9-CM: 530.81 Vitamin D deficiency     ICD-10-CM: E55.9 ICD-9-CM: 268.9 Nodule of finger, right     ICD-10-CM: R22.31 
ICD-9-CM: 782.2 Numbness of fingers     ICD-10-CM: R20.0 ICD-9-CM: 782.0 Encounter for monitoring opioid maintenance therapy     ICD-10-CM: Z51.81, [id number] ICD-9-CM: V58.83, V58.69 Vitals BP Pulse Temp Resp Height(growth percentile) Weight(growth percentile) 129/82 (BP 1 Location: Right arm, BP Patient Position: Sitting) 73 97.8 °F (36.6 °C) (Oral) 20 5' 9\" (1.753 m) 183 lb (83 kg) BMI Smoking Status 27.02 kg/m2 Current Every Day Smoker BMI and BSA Data Body Mass Index Body Surface Area  
 27.02 kg/m 2 2.01 m 2 Preferred Pharmacy Pharmacy Name Phone [id number] [address] [phone] Your Updated Medication List  
  
   
This list is accurate as of: 9/26/17 12:11 PM.  Always use your most recent med list.  
  
  
  
 ergocalciferol 50,000 unit capsule Commonly known as:  ERGOCALCIFEROL Take 1 Cap by mouth every seven (7) days. omeprazole 20 mg capsule Commonly known as:  PRILOSEC  
TAKE ONE CAPSULE BY MOUTH EVERY DAY  
  
 oxyCODONE-acetaminophen 5-325 mg per tablet Commonly known as:  PERCOCET Take 1 Tab by mouth every eight (8) hours as needed for Pain. Max Daily Amount: 3 Tabs. Prescriptions Printed Refills  
 oxyCODONE-acetaminophen (PERCOCET) 5-325 mg per tablet 0 Sig: Take 1 Tab by mouth every eight (8) hours as needed for Pain. Max Daily Amount: 3 Tabs. Class: Print Route: Oral  
  
Prescriptions Sent to Pharmacy Refills  
 omeprazole (PRILOSEC) 20 mg capsule 3 Sig: TAKE ONE CAPSULE BY MOUTH EVERY DAY Class: Normal  
 Pharmacy: [facility] [address] Ph #: [phone]  
 ergocalciferol (ERGOCALCIFEROL) 50,000 unit capsule 1 Sig: Take 1 Cap by mouth every seven (7) days. Class: Normal  
 Pharmacy: [address] [place] [address] Ph #: [phone] Route: Oral  
  
We Performed the Following REFERRAL TO NEUROLOGY [ADV16 Custom] Follow-up Instructions Return in about 2 months (around 11/26/2017). To-Do List   
 09/26/2017 Lab:  COMPLIANCE DRUG SCREEN/PRESCRIPTION MONITORING   
  
 09/26/2017 Imaging:  XR HAND RT AP/LAT Referral Information Referral ID Referred By Referred To  
  
 [id number] Libraod GRANT MD   
   [address] [place] [address] [address] Phone: [phone] Fax: [phone] Visits Status Start Date End Date 1 New Request 9/26/17 9/26/18 If your referral has a status of pending review or denied, additional information will be sent to support the outcome of this decision. Patient Instructions Please contact our office if you have any questions about your visit today. Introducing [facility]! Dear Diamond Mcginnis: Thank you for requesting a [employer] account. Our records indicate that you have previously registered for a [employer] account but its currently inactive. Please call our [employer] support line at [phone]. Additional Information If you have questions, please visit the Frequently Asked Questions section of the [employer] website at [email or web address] Remember, [employer] is NOT to be used for urgent needs. For medical emergencies, dial 911. Now available from your iPhone and Android! Please provide this summary of care documentation to your next provider. Your primary care clinician is listed as LUPE GRANT. If you have any questions after today's visit, please call [phone].

## 2017-09-26 NOTE — LETTER
Name:Ana Paula Thomas OBO:[birth year] MR #:[id number] Provider Janette Padilla MD  
*[id number]* BSMG-491 (5/16) Page 1 of 5 Initial [employer] CONTROLLED SUBSTANCE AGREEMENT I may be prescribed medications that are controlled substances as part  of my treatment plan for management of my medical condition(s). The goal of my treatment plan is to maintain and/or improve my health and wellbeing. Because controlled substances have an increased risk of abuse or harm, continual re-evaluation is needed determine if the goals of my treatment plan are being met for my safety and the safety of others. Alexis Joseph  am entering into this Controlled Substance Agreement with my provider, Gamal Kang MD at [place] [address] . I understand that successful treatment requires mutual trust and honesty between me and my provider. I understand that there are state and federal laws and regulations which apply to the medications that my provider may prescribe that must be followed. I understand there are risks and benefits ts of taking the medicines that my provider may prescribe. I understand and agree that following this Agreement is necessary in continuing my provider-patient relationship and success of my treatment plan. As a part of my treatment plan, I agree to the following: COMMUNICATION: 
 
1. I will communicate fully with my provider about my medical condition(s), including the effect on my daily life and how well my medications are helping. I will tell my provider all of the medications that I take for any reason, including medications I receive from another health care provider, and will notify my provider about all issues, problems or concerns, including any side effects, which may be related to my medications. I understand that this information allows my provider to adjust my treatment plan to help manage my medical condition.  I understand that this information will become part of my permanent medical record. 2. I will notify my provider if I have a history of alcohol/drug misuse/addiction or if I have had treatment for alcohol/drug addiction in the past, or if I have a new problem with or concern about alcohol/drug use/addiction, because this increases the likelihood of high risk behaviors and may lead to serious medical conditions. 3. Females Only: I will notify my provider if I am or become pregnant, or if I intend to become pregnant, or if I intend to breastfeed. I understand that communication of these issues with my provider is important, due to possible effects my medication could have on an unborn fetus or breastfeeding child. Name:. Stalin Patient XIF:[id number] MR #:[id number] Provider Cosat Shields MD  
*[id number]* BSMG-491 (5/16) Page 2 of 5 Initial [employer] MISUSE OF MEDICATIONS / DRUGS: 
 
1. I agree to take all controlled substances as prescribed, and will not misuse or abuse any controlled substances prescribed by my provider. For my safety, I will not increase the amount of medicine I take without first talking with and getting permission from my provider. 2. If I have a medical emergency, another health care provider may prescribe me medication. If I seek emergency treatment, I will notify my provider within seventy-two (72) hours. 3. I understand that my provider may discuss my use and/or possible misuse/abuse of controlled substances and alcohol, as appropriate, with any health care provider involved in my care, pharmacist or legal authority. ILLEGAL DRUGS: 
 
1. I will not use illegal drugs of any kind, including but not limited to marijuana, heroin, cocaine, or any prescription drug which is not prescribed to me. DRUG DIVERSION / PRESCRIPTION FRAUD: 
 
1. I will not share, sell, trade, give away, or otherwise misuse my prescriptions or medications. 2. I will not alter any prescriptions provided to me by my provider. SINGLE PROVIDER: 
 
1. I agree that all controlled substances that I take will be prescribed only by my provider (or his/her covering provider) under this Agreement. This agreement does not prevent me from seeking emergency medical treatment or receiving pain management related to a surgery. PROTECTING MEDICATIONS: 
 
1. I am responsible for keeping my prescriptions and medications in a safe and secure place including safeguarding them from loss or theft. I understand that lost, stolen or damaged/destroyed prescriptions or medications will not be replaced. Name:. Dilcia Lucero QMT:[birth year] MR #:[id number] Provider Jonatan Graff MD  
*[id number]* BSMG-491 (5/16) Page 3 of 5 Initial [employer] PRESCRIPTION RENEWALS/REFILLS: 
 
1. I will follow my controlled substance medication schedule as prescribed by my provider. 2. I understand and agree that I will make any requests for renewals or refills of my prescriptions only at the time of an office visit or during my providers regular office hours subject to the prescription refill requirements of the individual practice. 3. I understand that my provider may not call in prescriptions for controlled substances to my pharmacy. 4. I understand that my provider may adjust or discontinue these medications as deemed appropriate for my medical treatment plan. This Agreement does not guarantee the prescription of controlled medications. 5. I agree that if my medications are adjusted or discontinued, I will properly dispose of any remaining medications. I understand that I will be required to dispose of any remaining controlled medications prior to being provided with any prescriptions for other controlled medications.  
 
 
1. I authorize my provider and my pharmacy to cooperate fully with any local, state, or federal law enforcement agency in the investigation of any possible misuse, sale, or other diversion of my controlled substance prescriptions or medications. RISKS: 
 
 
1. I understand that if I do not adhere to this Agreement in any way, my provider may change my prescriptions, stop prescribing controlled substances or end our provider-patient relationship. 2. If my provider decides to stop prescribing medication, or decides to end our provider-patient relationship,my provider may require that I taper my medications slowly. If necessary, my provider may also provide a prescription for other medications to treat my withdrawal symptoms. UNDERSTANDING THIS AGREEMENT: 
 
I understand that my provider may adjust or stop my prescriptions for controlled substances based on my medical condition and my treatment plan. I understand that this Agreement does not guarantee that I will be prescribed medications or controlled substances. I understand that controlled substances may be just one part 
of my treatment plan. My initial on each page and my signature below shows that I have read each page of this Agreement, I have had an opportunity to ask questions, and all of my questions have been answered to my satisfaction by my provider. By signing below, I agree to comply with this Agreement, and I understand that if I do not follow the Agreements listed above, my provider may stop 
 
 
 
_________________________________________  Date/Time 9/26/2017 12:09 PM   
             (Patient Signature)

## 2017-10-03 LAB — DRUGS UR: NORMAL

## 2017-11-06 ENCOUNTER — OFFICE VISIT (OUTPATIENT)
Dept: NEUROLOGY | Age: 47
End: 2017-11-06

## 2017-11-06 VITALS
HEIGHT: 69 IN | DIASTOLIC BLOOD PRESSURE: 92 MMHG | SYSTOLIC BLOOD PRESSURE: 140 MMHG | TEMPERATURE: 98.1 F | WEIGHT: 180 LBS | HEART RATE: 97 BPM | OXYGEN SATURATION: 98 % | RESPIRATION RATE: 12 BRPM | BODY MASS INDEX: 26.66 KG/M2

## 2017-11-06 DIAGNOSIS — G56.01 CARPAL TUNNEL SYNDROME OF RIGHT WRIST: Primary | ICD-10-CM

## 2017-11-06 NOTE — LETTER
11/6/2017 2:16 PM 
 
Patient:  Kala Mireles YOB: 1970 Date of Visit: 11/6/2017 Dear Marlo Ascencio, MD [place] [address] [id number] [address] [address] VIA In Basket 
 : Thank you for referring Mr. Camryn Hammer to me for evaluation/treatment. Below are the relevant portions of my assessment and plan of care. Kala Mireles is a 42 y.o., right handed male, with no real medical history who has been experiencing numbness of the right hand intermittently. There's no symptoms on the left. He says he gets the hand in any particular position and gets a tingling sensation that goes away when he changes position. He says it occurs often when he works as a . He denies any significant neck pain. The numbness can wake him up, with a burning sensation of the hand. There's been no recent trauma. These symptoms have been going on for no less than 3-4 weeks. He was to the doctor for a routine evaluation for an umbilical hernia he has. He had Fariba Poor in his 29's with no residual deficits. Social History; smokes 2 packs per week. Drinks 1-2 beers per week. No illicit drugs. Single, lives with his fiance. Family History; mother alive with diabetes, hypertension. Father alive but doesn't know his health history. 3 sisters in good health except for some hypertension. Current Outpatient Prescriptions Medication Sig Dispense Refill  oxyCODONE-acetaminophen (PERCOCET) 5-325 mg per tablet Take 1 Tab by mouth every eight (8) hours as needed for Pain. Max Daily Amount: 3 Tabs. 30 Tab 0  
 omeprazole (PRILOSEC) 20 mg capsule TAKE ONE CAPSULE BY MOUTH EVERY DAY 90 Cap 3  
 ergocalciferol (ERGOCALCIFEROL) 50,000 unit capsule Take 1 Cap by mouth every seven (7) days. 12 Cap 1 Past Medical History:  
Diagnosis Date  GERD (gastroesophageal reflux disease) Past Surgical History:  
Procedure Laterality Date  HX HERNIA REPAIR Left Allergies Allergen Reactions  Protonix [Pantoprazole] Palpitations Patient Active Problem List  
Diagnosis Code  Gastroesophageal reflux disease without esophagitis K21.9  Left inguinal hernia K40.90  Umbilical hernia without obstruction and without gangrene K42.9  Vitamin D deficiency E55.9 Review of Systems: As above otherwise 11 point review of systems negative including;  
Constitutional no fever or chills Skin denies rash or itching HENT  Denies tinnitus, hearing lose Eyes denies diplopia vision lose Respiratory denies shortness of breath Cardiovascular denies chest pain, dyspnea on exertion Gastrointestinal denies nausea, vomiting, diarrhea, constipation Genitourinary denies incontinence Musculoskeletal denies joint pain or swelling Endocrine denies weight change Hematology denies easy bruising or bleeding Neurological as above in HPI PHYSICAL EXAMINATION:   
 
VITAL SIGNS:   
Visit Vitals  BP (!) 140/92 (BP 1 Location: Left arm, BP Patient Position: Sitting)  Pulse 97  Temp 98.1 °F (36.7 °C) (Oral)  Resp 12  Ht 5' 9\" (1.753 m)  Wt 81.6 kg (180 lb)  SpO2 98%  BMI 26.58 kg/m2 GENERAL: The patient is well developed, well nourished, and in no apparent distress. EXTREMITIES: No clubbing, cyanosis, or edema is identified. Pulses 2+ and symmetrical.  Muscle tone is normal. 
HEAD:   Ear, nose, and throat appear to be without trauma. The patient is normocephalic. NEUROLOGIC EXAMINATION 
 
MENTAL STATUS: The patient is awake, alert, and oriented x 4. Fund of knowledge is adequate. Speech is fluent and memory appears to be intact, both long and short term. CRANIAL NERVES: II  Visual fields are full to confrontation. Funduscopic examination reveals flat disks bilaterally. Pupils are both 4 mm and briskly reactive to light and accommodation. III, IV, VI  Extraocular movements are intact and there is no nystagmus. V  Facial sensation is intact to pinprick and light touch. VII  Face is symmetrical.  
VIII - Hearing is present. IX, X, [address] rises symmetrically. Gag is present. Tongue is in the midline. XI - Shoulder shrugging and head turning intact MOTOR:  The patient is 5/5 in all four limbs without any drift. Fine finger movements are symmetrical.  Isolated motor group testing reveals no focal abnormalities. Tone is normal.  Sensory examination is intact to pinprick, light touch and position sense testing. Reflexes are 2+ and symmetrical. Plantars are down going. Cerebellar examination reveals no gross ataxia or dysmetria. Gait is normal and the patient can tandem walk without any difficulty. CBC:  
Lab Results Component Value Date/Time WBC 5.8 05/10/2017 07:57 AM  
 RBC 4.69 05/10/2017 07:57 AM  
 HGB 14.4 05/10/2017 07:57 AM  
 HCT 44.3 05/10/2017 07:57 AM  
 PLATELET 538 82/95/2699 07:57 AM  
 
BMP:  
Lab Results Component Value Date/Time Glucose 95 05/10/2017 07:57 AM  
 Sodium 141 05/10/2017 07:57 AM  
 Potassium 4.6 05/10/2017 07:57 AM  
 Chloride 104 05/10/2017 07:57 AM  
 CO2 28 05/10/2017 07:57 AM  
 BUN 10 05/10/2017 07:57 AM  
 Creatinine 0.95 05/10/2017 07:57 AM  
 Calcium 8.8 05/10/2017 07:57 AM  
 
CMP:  
Lab Results Component Value Date/Time Glucose 95 05/10/2017 07:57 AM  
 Sodium 141 05/10/2017 07:57 AM  
 Potassium 4.6 05/10/2017 07:57 AM  
 Chloride 104 05/10/2017 07:57 AM  
 CO2 28 05/10/2017 07:57 AM  
 BUN 10 05/10/2017 07:57 AM  
 Creatinine 0.95 05/10/2017 07:57 AM  
 Calcium 8.8 05/10/2017 07:57 AM  
 Anion gap 9 05/10/2017 07:57 AM  
 BUN/Creatinine ratio 11 05/10/2017 07:57 AM  
 Alk. phosphatase 99 05/10/2017 07:57 AM  
 Protein, total 7.3 05/10/2017 07:57 AM  
 Albumin 3.7 05/10/2017 07:57 AM  
 Globulin 3.6 05/10/2017 07:57 AM  
 A-G Ratio 1.0 05/10/2017 07:57 AM  
 
Coagulation: No results found for: PTP, INR, APTT, PTTT Impression: Intermittent numbness of the right hand with use and positioning. This sounds like carpal tunnel syndrome on the right. Plan: Will try to get an EMG/NCV of the right hand to see if this is indeed CTS. No reason for follow up after acquisition of the test.   
 
[place] [address] [place], [address] 
[phone]      [place] [address] Neurophysiology Report Patient: Meryl Mabry ID: [id number] Physician: Billie Link MD  
Gender: Male Ref Phys: Macario Gutierres. Manjinder Kaba MD  
Handedness:     
Study Date: November 6, 2017 Nerve Conduction Studies Anti Sensory Summary Table Stim Site NR Peak (ms) Norm Peak (ms) O-P Amp (µV) Norm O-P Amp Dist (cm) Jelani (m/s) Left Median 2nd Digit Anti Sensory (2nd Digit) Wrist    4.6 <3.5 15.2 >20 13.0 37.1 Site 2    4.5  18.9 Right Median 2nd Digit Anti Sensory (2nd Digit) Wrist    5.9 <3.5 23.5 >20 13.0 35.1 Site 2    6.0  11.6 Left Ulnar Anti Sensory (5th Digit ) Site 2    3.7  34.1 Site 3    3.7  36.0 Right Ulnar Anti Sensory (5th Digit ) Wrist    4.0 <3.1 21.2 >17.0 11.0 37.9 Site 2    4.1  20.7 Motor Summary Table Stim Site NR Onset (ms) Norm Onset (ms) O-P Amp (mV) Norm O-P Amp Dist (cm) Jelani (m/s) Norm Jelani (m/s) Left Median Motor (Abd Poll Brev) Wrist    4.2 <4.4 17.0 >4.0 27.0 62.8 >49 Elbow    8.5  17.4 Right Median Motor (Abd Poll Brev) Wrist    5.4 <4.4 10.8 >4.0 26.0 56.5 >49 Elbow    10.0  10.4 Left Ulnar Motor (Abd Dig Minimi ) Wrist    3.0 <3.3 9.9 >6.0 18.0 52.9 >49 B Elbow    6.4  10.7  15.0 51.7 >50 A Elbow    9.3  10.3 Right Ulnar Motor (Abd Dig Minimi ) Wrist    2.9 <3.3 9.5 >6.0 20.0 52.6 >49 B Elbow    6.7  6.6  15.0 51.7 >50 A Elbow    9.6  9.4 EMG Side Muscle Nerve Root Ins Act Fibs Psw Fasc Amp Dur Poly Recrt Int Mammie Scales Comment Right Deltoid Axillary C5-6 Nml Nml Nml None Nml Nml 0 Nml Nml   
 Right Biceps Musculocut C5-6 Nml Nml Nml None Nml Nml 0 Nml Nml Right Triceps Radial C6-7-8 Nml Nml Nml None Nml Nml 0 Nml Nml Right FlexCarRad Median C6-7 Nml Nml Nml None Nml Nml 0 Nml Nml Right 1stDorInt Ulnar C8-T1 Nml Nml Nml None Nml Nml 0 Nml Nml NCS/EMG FINDINGS: 
 
? Evaluation of the Left median motor, the Left ulnar motor, and the Right ulnar motor nerves were unremarkable. ? The Right median motor nerve showed prolonged distal onset latency (5.4 ms). ? The Left Median 2nd Digit sensory nerve showed prolonged distal peak latency (4.6 ms), reduced amplitude (15.2 µV), and decreased conduction velocity (Wrist-2nd Digit, 37.1 m/s). ? The Right Median 2nd Digit sensory nerve showed prolonged distal peak latency (5.9 ms) and decreased conduction velocity (Wrist-2nd Digit, 35.1 m/s). ? The Right ulnar sensory nerve showed prolonged distal peak latency (4.0 ms) and decreased conduction velocity (Wrist-5th Digit, 37.9 m/s). INTERPRETATION: This was an abnormal nerve conduction and EMG study showing: 
 
 (1) prolongation of the distal latencies in the right median nerve motor neuron consistent with right-sided carpal tunnel syndrome. (2) prolongation of the distal latencies the ulnar nerves sensory branches as well as the median nerve sensory branches which would be consistent with early sensory neuropathy. 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 
 
 
___________________________ Jovana Moses MD 
 
 
 
 
Waveforms: If you have questions, please do not hesitate to call me. I look forward to following Mr. Thomas along with you.  
 
 
 
Sincerely, 
 
 
Susanna Cui MD

## 2017-11-06 NOTE — MR AVS SNAPSHOT
Visit Information Date & Time Provider Department Dept. Phone Encounter #  
 11/6/2017  1:00 [phone] [id number] Follow-up Instructions Return if symptoms worsen or fail to improve. Your Appointments 11/27/2017 10:00 AM  
Follow Up with Delores Wheeler MD  
[address] (--) Appt Note: 2 month fu  
 [place] [address] [id number] [address] [address] [phone]  
  
   
 [place] [address] [id number] [address] [address] Upcoming Health Maintenance Date Due Pneumococcal 19-64 Medium Risk (1 of 1 - PPSV23) 11/11/1991 COLONOSCOPY 12/12/2024 DTaP/Tdap/Td series (2 - Td) 8/23/2026 Allergies as of 11/6/2017  Review Complete On: 11/6/2017 By: Grace Barrett LPN Severity Noted Reaction Type Reactions Protonix [Pantoprazole]  04/21/2015    Palpitations Current Immunizations  Never Reviewed No immunizations on file. Not reviewed this visit You Were Diagnosed With   
  
 Codes Comments Carpal tunnel syndrome of right wrist    -  Primary ICD-10-CM: G56.01 
ICD-9-CM: 354.0 Vitals BP Pulse Temp Resp Height(growth percentile) Weight(growth percentile) (!) 140/92 (BP 1 Location: Left arm, BP Patient Position: Sitting) 97 98.1 °F (36.7 °C) (Oral) 12 5' 9\" (1.753 m) 180 lb (81.6 kg) SpO2 BMI Smoking Status 98% 26.58 kg/m2 Current Every Day Smoker Vitals History BMI and BSA Data Body Mass Index Body Surface Area  
 26.58 kg/m 2 1.99 m 2 Preferred Pharmacy Pharmacy Name Phone [id number] [address] [phone] Your Updated Medication List  
  
   
This list is accurate as of: 11/6/17  2:01 PM.  Always use your most recent med list.  
  
  
  
  
 ergocalciferol 50,000 unit capsule Commonly known as:  ERGOCALCIFEROL Take 1 Cap by mouth every seven (7) days. omeprazole 20 mg capsule Commonly known as:  PRILOSEC  
TAKE ONE CAPSULE BY MOUTH EVERY DAY  
  
 oxyCODONE-acetaminophen 5-325 mg per tablet Commonly known as:  PERCOCET Take 1 Tab by mouth every eight (8) hours as needed for Pain. Max Daily Amount: 3 Tabs. We Performed the Following REFERRAL TO ORTHOPEDICS [[id number] Custom] Follow-up Instructions Return if symptoms worsen or fail to improve. Referral Information Referral ID Referred By Referred To  
  
 [id number] KATHRYN, [address] [facility],[address], MD   
   [address] [facility] [place], [address] Phone: [phone] Fax: [phone] Visits Status Start Date End Date 1 New Request 11/6/17 11/6/18 If your referral has a status of pending review or denied, additional information will be sent to support the outcome of this decision. Introducing [facility]! Dear Jonnathan Patrick: Thank you for requesting a My[employer] account. Our records indicate that you have previously registered for a MyFab account but its currently inactive. Please call our MyFab support line at [phone]. Additional Information If you have questions, please visit the Frequently Asked Questions section of the My[employer] website at [email or web address] Remember, MyFab is NOT to be used for urgent needs. For medical emergencies, dial 911. Now available from your iPhone and Android! Please provide this summary of care documentation to your next provider. Your primary care clinician is listed as LUPE GRANT. If you have any questions after today's visit, please call [phone].

## 2017-11-06 NOTE — COMMUNICATION BODY
Kala Mireles is a 42 y.o., right handed male, with no real medical history who has been experiencing numbness of the right hand intermittently. There's no symptoms on the left. He says he gets the hand in any particular position and gets a tingling sensation that goes away when he changes position. He says it occurs often when he works as a . He denies any significant neck pain. The numbness can wake him up, with a burning sensation of the hand. There's been no recent trauma. These symptoms have been going on for no less than 3-4 weeks. He was to the doctor for a routine evaluation for an umbilical hernia he has. He had Fariba Poor in his 29's with no residual deficits. Social History; smokes 2 packs per week. Drinks 1-2 beers per week. No illicit drugs. Single, lives with his fiance. Family History; mother alive with diabetes, hypertension. Father alive but doesn't know his health history. 3 sisters in good health except for some hypertension. Current Outpatient Prescriptions   Medication Sig Dispense Refill    oxyCODONE-acetaminophen (PERCOCET) 5-325 mg per tablet Take 1 Tab by mouth every eight (8) hours as needed for Pain. Max Daily Amount: 3 Tabs. 30 Tab 0    omeprazole (PRILOSEC) 20 mg capsule TAKE ONE CAPSULE BY MOUTH EVERY DAY 90 Cap 3    ergocalciferol (ERGOCALCIFEROL) 50,000 unit capsule Take 1 Cap by mouth every seven (7) days.  12 Cap 1       Past Medical History:   Diagnosis Date    GERD (gastroesophageal reflux disease)        Past Surgical History:   Procedure Laterality Date    HX HERNIA REPAIR Left        Allergies   Allergen Reactions    Protonix [Pantoprazole] Palpitations       Patient Active Problem List   Diagnosis Code    Gastroesophageal reflux disease without esophagitis K21.9    Left inguinal hernia R75.77    Umbilical hernia without obstruction and without gangrene K42.9    Vitamin D deficiency E55.9         Review of Systems: As above otherwise 11 point review of systems negative including;   Constitutional no fever or chills  Skin denies rash or itching  HENT  Denies tinnitus, hearing lose  Eyes denies diplopia vision lose  Respiratory denies shortness of breath  Cardiovascular denies chest pain, dyspnea on exertion  Gastrointestinal denies nausea, vomiting, diarrhea, constipation  Genitourinary denies incontinence  Musculoskeletal denies joint pain or swelling  Endocrine denies weight change  Hematology denies easy bruising or bleeding   Neurological as above in HPI      PHYSICAL EXAMINATION:      VITAL SIGNS:    Visit Vitals    BP (!) 140/92 (BP 1 Location: Left arm, BP Patient Position: Sitting)    Pulse 97    Temp 98.1 °F (36.7 °C) (Oral)    Resp 12    Ht 5' 9\" (1.753 m)    Wt 81.6 kg (180 lb)    SpO2 98%    BMI 26.58 kg/m2       GENERAL: The patient is well developed, well nourished, and in no apparent distress. EXTREMITIES: No clubbing, cyanosis, or edema is identified. Pulses 2+ and symmetrical.  Muscle tone is normal.  HEAD:   Ear, nose, and throat appear to be without trauma. The patient is normocephalic. NEUROLOGIC EXAMINATION    MENTAL STATUS: The patient is awake, alert, and oriented x 4. Fund of knowledge is adequate. Speech is fluent and memory appears to be intact, both long and short term. CRANIAL NERVES: II - Visual fields are full to confrontation. Funduscopic examination reveals flat disks bilaterally. Pupils are both 4 mm and briskly reactive to light and accommodation. III, IV, VI - Extraocular movements are intact and there is no nystagmus. V - Facial sensation is intact to pinprick and light touch. VII - Face is symmetrical.   VIII - Hearing is present. IX, X, XII- Palate rises symmetrically. Gag is present. Tongue is in the midline. XI - Shoulder shrugging and head turning intact  MOTOR:  The patient is 5/5 in all four limbs without any drift.  Fine finger movements are symmetrical.  Isolated motor group testing reveals no focal abnormalities. Tone is normal.  Sensory examination is intact to pinprick, light touch and position sense testing. Reflexes are 2+ and symmetrical. Plantars are down going. Cerebellar examination reveals no gross ataxia or dysmetria. Gait is normal and the patient can tandem walk without any difficulty. CBC:   Lab Results   Component Value Date/Time    WBC 5.8 05/10/2017 07:57 AM    RBC 4.69 05/10/2017 07:57 AM    HGB 14.4 05/10/2017 07:57 AM    HCT 44.3 05/10/2017 07:57 AM    PLATELET 999 89/72/2126 07:57 AM     BMP:   Lab Results   Component Value Date/Time    Glucose 95 05/10/2017 07:57 AM    Sodium 141 05/10/2017 07:57 AM    Potassium 4.6 05/10/2017 07:57 AM    Chloride 104 05/10/2017 07:57 AM    CO2 28 05/10/2017 07:57 AM    BUN 10 05/10/2017 07:57 AM    Creatinine 0.95 05/10/2017 07:57 AM    Calcium 8.8 05/10/2017 07:57 AM     CMP:   Lab Results   Component Value Date/Time    Glucose 95 05/10/2017 07:57 AM    Sodium 141 05/10/2017 07:57 AM    Potassium 4.6 05/10/2017 07:57 AM    Chloride 104 05/10/2017 07:57 AM    CO2 28 05/10/2017 07:57 AM    BUN 10 05/10/2017 07:57 AM    Creatinine 0.95 05/10/2017 07:57 AM    Calcium 8.8 05/10/2017 07:57 AM    Anion gap 9 05/10/2017 07:57 AM    BUN/Creatinine ratio 11 05/10/2017 07:57 AM    Alk. phosphatase 99 05/10/2017 07:57 AM    Protein, total 7.3 05/10/2017 07:57 AM    Albumin 3.7 05/10/2017 07:57 AM    Globulin 3.6 05/10/2017 07:57 AM    A-G Ratio 1.0 05/10/2017 07:57 AM     Coagulation: No results found for: PTP, INR, APTT, PTTT       Impression: Intermittent numbness of the right hand with use and positioning. This sounds like carpal tunnel syndrome on the right. Plan: Will try to get an EMG/NCV of the right hand to see if this is indeed CTS.   No reason for follow up after acquisition of the test.      Cat Evans [facility]  [address] [facility] [place], [address] [phone] Dominik Mejias [phone]    Neurophysiology Report      Patient: Wali Paniagua     ID: [id number] Physician: Trace Angel MD   Gender: Male Ref Phys: Tim Selby.   Jinny Loja MD   Handedness:      Study Date: November 6, 2017       Nerve Conduction Studies  Anti Sensory Summary Table     Stim Site NR Peak (ms) Norm Peak (ms) O-P Amp (µV) Norm O-P Amp Dist (cm) Jelani (m/s)   Left Median 2nd Digit Anti Sensory (2nd Digit)   Wrist    4.6 <3.5 15.2 >20 13.0 37.1   Site 2    4.5  18.9      Right Median 2nd Digit Anti Sensory (2nd Digit)   Wrist    5.9 <3.5 23.5 >20 13.0 35.1   Site 2    6.0  11.6      Left Ulnar Anti Sensory (5th Digit )   Site 2    3.7  34.1      Site 3    3.7  36.0      Right Ulnar Anti Sensory (5th Digit )   Wrist    4.0 <3.1 21.2 >17.0 11.0 37.9   Site 2    4.1  20.7        Motor Summary Table     Stim Site NR Onset (ms) Norm Onset (ms) O-P Amp (mV) Norm O-P Amp Dist (cm) Jelani (m/s) Norm Jelani (m/s)   Left Median Motor (Abd Poll Brev)   Wrist    4.2 <4.4 17.0 >4.0 27.0 62.8 >49   Elbow    8.5  17.4       Right Median Motor (Abd Poll Brev)   Wrist    5.4 <4.4 10.8 >4.0 26.0 56.5 >49   Elbow    10.0  10.4       Left Ulnar Motor (Abd Dig Minimi )   Wrist    3.0 <3.3 9.9 >6.0 18.0 52.9 >49   B Elbow    6.4  10.7  15.0 51.7 >50   A Elbow    9.3  10.3       Right Ulnar Motor (Abd Dig Minimi )   Wrist    2.9 <3.3 9.5 >6.0 20.0 52.6 >49   B Elbow    6.7  6.6  15.0 51.7 >50   A Elbow    9.6  9.4           EMG     Side Muscle Nerve Root Ins Act Fibs Psw Fasc Amp Dur Poly Recrt Int Bruno [place] Comment   Right Deltoid Axillary C5-6 Nml Nml Nml None Nml Nml 0 Nml Nml    Right Biceps Musculocut C5-6 Nml Nml Nml None Nml Nml 0 Nml Nml    Right Triceps Radial C6-7-8 Nml Nml Nml None Nml Nml 0 Nml Nml    Right FlexCarRad Median C6-7 Nml Nml Nml None Nml Nml 0 Nml Nml    Right 1stDorInt Ulnar C8-T1 Nml Nml Nml None Nml Nml 0 Nml Nml      NCS/EMG FINDINGS:     Evaluation of the Left median motor, the Left ulnar motor, and the Right ulnar motor nerves were unremarkable.  The Right median motor nerve showed prolonged distal onset latency (5.4 ms).  The Left Median 2nd Digit sensory nerve showed prolonged distal peak latency (4.6 ms), reduced amplitude (15.2 µV), and decreased conduction velocity (Wrist-2nd Digit, 37.1 m/s).  The Right Median 2nd Digit sensory nerve showed prolonged distal peak latency (5.9 ms) and decreased conduction velocity (Wrist-2nd Digit, 35.1 m/s).  The Right ulnar sensory nerve showed prolonged distal peak latency (4.0 ms) and decreased conduction velocity (Wrist-5th Digit, 37.9 m/s). INTERPRETATION: This was an abnormal nerve conduction and EMG study showing:     (1) prolongation of the distal latencies in the right median nerve motor neuron consistent with right-sided carpal tunnel syndrome. (2) prolongation of the distal latencies the ulnar nerves sensory branches as well as the median nerve sensory branches which would be consistent with early sensory neuropathy.   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      ___________________________  Juanito Pabon MD          Waveforms:

## 2017-11-06 NOTE — PROGRESS NOTES
Brady Ch is a 42 y.o., right handed male, with no real medical history who has been experiencing numbness of the right hand intermittently. There's no symptoms on the left. He says he gets the hand in any particular position and gets a tingling sensation that goes away when he changes position. He says it occurs often when he works as a . He denies any significant neck pain. The numbness can wake him up, with a burning sensation of the hand. There's been no recent trauma. These symptoms have been going on for no less than 3-4 weeks. He was to the doctor for a routine evaluation for an umbilical hernia he has. He had Ginger Freddy in his 31's with no residual deficits. Social History; smokes 2 packs per week. Drinks 1-2 beers per week. No illicit drugs. Single, lives with his fiance. Family History; mother alive with diabetes, hypertension. Father alive but doesn't know his health history. 3 sisters in good health except for some hypertension. Current Outpatient Prescriptions   Medication Sig Dispense Refill    oxyCODONE-acetaminophen (PERCOCET) 5-325 mg per tablet Take 1 Tab by mouth every eight (8) hours as needed for Pain. Max Daily Amount: 3 Tabs. 30 Tab 0    omeprazole (PRILOSEC) 20 mg capsule TAKE ONE CAPSULE BY MOUTH EVERY DAY 90 Cap 3    ergocalciferol (ERGOCALCIFEROL) 50,000 unit capsule Take 1 Cap by mouth every seven (7) days.  12 Cap 1       Past Medical History:   Diagnosis Date    GERD (gastroesophageal reflux disease)        Past Surgical History:   Procedure Laterality Date    HX HERNIA REPAIR Left        Allergies   Allergen Reactions    Protonix [Pantoprazole] Palpitations       Patient Active Problem List   Diagnosis Code    Gastroesophageal reflux disease without esophagitis K21.9    Left inguinal hernia A29.12    Umbilical hernia without obstruction and without gangrene K42.9    Vitamin D deficiency E55.9         Review of Systems: As above otherwise 11 point review of systems negative including;   Constitutional no fever or chills  Skin denies rash or itching  HENT  Denies tinnitus, hearing lose  Eyes denies diplopia vision lose  Respiratory denies shortness of breath  Cardiovascular denies chest pain, dyspnea on exertion  Gastrointestinal denies nausea, vomiting, diarrhea, constipation  Genitourinary denies incontinence  Musculoskeletal denies joint pain or swelling  Endocrine denies weight change  Hematology denies easy bruising or bleeding   Neurological as above in HPI      PHYSICAL EXAMINATION:      VITAL SIGNS:    Visit Vitals    BP (!) 140/92 (BP 1 Location: Left arm, BP Patient Position: Sitting)    Pulse 97    Temp 98.1 °F (36.7 °C) (Oral)    Resp 12    Ht 5' 9\" (1.753 m)    Wt 81.6 kg (180 lb)    SpO2 98%    BMI 26.58 kg/m2       GENERAL: The patient is well developed, well nourished, and in no apparent distress. EXTREMITIES: No clubbing, cyanosis, or edema is identified. Pulses 2+ and symmetrical.  Muscle tone is normal.  HEAD:   Ear, nose, and throat appear to be without trauma. The patient is normocephalic. NEUROLOGIC EXAMINATION    MENTAL STATUS: The patient is awake, alert, and oriented x 4. Fund of knowledge is adequate. Speech is fluent and memory appears to be intact, both long and short term. CRANIAL NERVES: II - Visual fields are full to confrontation. Funduscopic examination reveals flat disks bilaterally. Pupils are both 4 mm and briskly reactive to light and accommodation. III, IV, VI - Extraocular movements are intact and there is no nystagmus. V - Facial sensation is intact to pinprick and light touch. VII - Face is symmetrical.   VIII - Hearing is present. IX, X, XII- Palate rises symmetrically. Gag is present. Tongue is in the midline. XI - Shoulder shrugging and head turning intact  MOTOR:  The patient is 5/5 in all four limbs without any drift.  Fine finger movements are symmetrical.  Isolated motor group testing reveals no focal abnormalities. Tone is normal.  Sensory examination is intact to pinprick, light touch and position sense testing. Reflexes are 2+ and symmetrical. Plantars are down going. Cerebellar examination reveals no gross ataxia or dysmetria. Gait is normal and the patient can tandem walk without any difficulty. CBC:   Lab Results   Component Value Date/Time    WBC 5.8 05/10/2017 07:57 AM    RBC 4.69 05/10/2017 07:57 AM    HGB 14.4 05/10/2017 07:57 AM    HCT 44.3 05/10/2017 07:57 AM    PLATELET 464 91/27/2265 07:57 AM     BMP:   Lab Results   Component Value Date/Time    Glucose 95 05/10/2017 07:57 AM    Sodium 141 05/10/2017 07:57 AM    Potassium 4.6 05/10/2017 07:57 AM    Chloride 104 05/10/2017 07:57 AM    CO2 28 05/10/2017 07:57 AM    BUN 10 05/10/2017 07:57 AM    Creatinine 0.95 05/10/2017 07:57 AM    Calcium 8.8 05/10/2017 07:57 AM     CMP:   Lab Results   Component Value Date/Time    Glucose 95 05/10/2017 07:57 AM    Sodium 141 05/10/2017 07:57 AM    Potassium 4.6 05/10/2017 07:57 AM    Chloride 104 05/10/2017 07:57 AM    CO2 28 05/10/2017 07:57 AM    BUN 10 05/10/2017 07:57 AM    Creatinine 0.95 05/10/2017 07:57 AM    Calcium 8.8 05/10/2017 07:57 AM    Anion gap 9 05/10/2017 07:57 AM    BUN/Creatinine ratio 11 05/10/2017 07:57 AM    Alk. phosphatase 99 05/10/2017 07:57 AM    Protein, total 7.3 05/10/2017 07:57 AM    Albumin 3.7 05/10/2017 07:57 AM    Globulin 3.6 05/10/2017 07:57 AM    A-G Ratio 1.0 05/10/2017 07:57 AM     Coagulation: No results found for: PTP, INR, APTT, PTTT       Impression: Intermittent numbness of the right hand with use and positioning. This sounds like carpal tunnel syndrome on the right. Plan: Will try to get an EMG/NCV of the right hand to see if this is indeed CTS.   No reason for follow up after acquisition of the test.      [facility]  [address] [facility]  [facility] [employer], [address] [phone] Luisa Finch [phone]    Neurophysiology Report      Patient: Paul Nunez     ID: [id number] Physician: George East MD   Gender: Male Ref Phys: Neel Lott.   Jessica Cherry MD   Handedness:      Study Date: November 6, 2017       Nerve Conduction Studies  Anti Sensory Summary Table     Stim Site NR Peak (ms) Norm Peak (ms) O-P Amp (µV) Norm O-P Amp Dist (cm) Jelani (m/s)   Left Median 2nd Digit Anti Sensory (2nd Digit)   Wrist    4.6 <3.5 15.2 >20 13.0 37.1   Site 2    4.5  18.9      Right Median 2nd Digit Anti Sensory (2nd Digit)   Wrist    5.9 <3.5 23.5 >20 13.0 35.1   Site 2    6.0  11.6      Left Ulnar Anti Sensory (5th Digit )   Site 2    3.7  34.1      Site 3    3.7  36.0      Right Ulnar Anti Sensory (5th Digit )   Wrist    4.0 <3.1 21.2 >17.0 11.0 37.9   Site 2    4.1  20.7        Motor Summary Table     Stim Site NR Onset (ms) Norm Onset (ms) O-P Amp (mV) Norm O-P Amp Dist (cm) Jelani (m/s) Norm Jelani (m/s)   Left Median Motor (Abd Poll Brev)   Wrist    4.2 <4.4 17.0 >4.0 27.0 62.8 >49   Elbow    8.5  17.4       Right Median Motor (Abd Poll Brev)   Wrist    5.4 <4.4 10.8 >4.0 26.0 56.5 >49   Elbow    10.0  10.4       Left Ulnar Motor (Abd Dig Minimi )   Wrist    3.0 <3.3 9.9 >6.0 18.0 52.9 >49   B Elbow    6.4  10.7  15.0 51.7 >50   A Elbow    9.3  10.3       Right Ulnar Motor (Abd Dig Minimi )   Wrist    2.9 <3.3 9.5 >6.0 20.0 52.6 >49   B Elbow    6.7  6.6  15.0 51.7 >50   A Elbow    9.6  9.4           EMG     Side Muscle Nerve Root Ins Act Fibs Psw Fasc Amp Dur Poly Recrt Int Mueller Chess Comment   Right Deltoid Axillary C5-6 Nml Nml Nml None Nml Nml 0 Nml Nml    Right Biceps Musculocut C5-6 Nml Nml Nml None Nml Nml 0 Nml Nml    Right Triceps Radial C6-7-8 Nml Nml Nml None Nml Nml 0 Nml Nml    Right FlexCarRad Median C6-7 Nml Nml Nml None Nml Nml 0 Nml Nml    Right 1stDorInt Ulnar C8-T1 Nml Nml Nml None Nml Nml 0 Nml Nml      NCS/EMG FINDINGS:     Evaluation of the Left median motor, the Left ulnar motor, and the Right ulnar motor nerves were unremarkable.  The Right median motor nerve showed prolonged distal onset latency (5.4 ms).  The Left Median 2nd Digit sensory nerve showed prolonged distal peak latency (4.6 ms), reduced amplitude (15.2 µV), and decreased conduction velocity (Wrist-2nd Digit, 37.1 m/s).  The Right Median 2nd Digit sensory nerve showed prolonged distal peak latency (5.9 ms) and decreased conduction velocity (Wrist-2nd Digit, 35.1 m/s).  The Right ulnar sensory nerve showed prolonged distal peak latency (4.0 ms) and decreased conduction velocity (Wrist-5th Digit, 37.9 m/s). INTERPRETATION: This was an abnormal nerve conduction and EMG study showing:     (1) prolongation of the distal latencies in the right median nerve motor neuron consistent with right-sided carpal tunnel syndrome. (2) prolongation of the distal latencies the ulnar nerves sensory branches as well as the median nerve sensory branches which would be consistent with early sensory neuropathy.   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      ___________________________  Heidi Odell MD          Waveforms:                      [address] [facility]  OFFICE PROCEDURE PROGRESS NOTE        Chart reviewed for the following:   Troy Burdick MD, have reviewed the History, Physical and updated the Allergic reactions for [address] performed immediately prior to start of procedure:   Troy Burdick MD, have performed the following reviews on Desi Phillips prior to the start of the procedure:            * Patient was identified by name and date of birth   * Agreement on procedure being performed was verified  * Risks and Benefits explained to the patient  * Procedure site verified and marked as necessary  * Patient was positioned for comfort  * Consent was signed and verified     Time: 2:15 PM    Date of procedure: 11/6/2017    Procedure performed by:  Jenaro Watson Jaguar Lopez MD    Patient assisted by: self    How tolerated by patient: tolerated the procedure well with no complications    Post Procedural Pain Scale: 0 - No Hurt    Comments: none

## 2018-01-12 ENCOUNTER — DOCUMENTATION ONLY (OUTPATIENT)
Dept: NEUROLOGY | Age: 48
End: 2018-01-12

## 2018-01-12 NOTE — PROGRESS NOTES
Patient had appointment for Orthopaedics. Pt no showed. The office tried to reach patient and was unable to.

## 2019-09-20 ENCOUNTER — HOSPITAL ENCOUNTER (EMERGENCY)
Age: 49
Discharge: HOME | End: 2019-09-20
Attending: EMERGENCY MEDICINE
Payer: SUBSIDIZED

## 2019-09-20 VITALS
HEIGHT: 68 IN | SYSTOLIC BLOOD PRESSURE: 145 MMHG | TEMPERATURE: 98.5 F | OXYGEN SATURATION: 99 % | DIASTOLIC BLOOD PRESSURE: 97 MMHG | RESPIRATION RATE: 18 BRPM | BODY MASS INDEX: 28.34 KG/M2 | WEIGHT: 187 LBS | HEART RATE: 81 BPM

## 2019-09-20 DIAGNOSIS — N34.2 URETHRITIS: Primary | ICD-10-CM

## 2019-09-20 LAB
APPEARANCE UR: CLEAR
BACTERIA URNS QL MICRO: NEGATIVE /HPF
BILIRUB UR QL: NEGATIVE
COLOR UR: YELLOW
EPITH CASTS URNS QL MICRO: NORMAL /LPF (ref 0–5)
GLUCOSE UR STRIP.AUTO-MCNC: NEGATIVE MG/DL
HGB UR QL STRIP: NEGATIVE
KETONES UR QL STRIP.AUTO: NEGATIVE MG/DL
LEUKOCYTE ESTERASE UR QL STRIP.AUTO: ABNORMAL
NITRITE UR QL STRIP.AUTO: NEGATIVE
PH UR STRIP: 6.5 [PH] (ref 5–8)
PROT UR STRIP-MCNC: NEGATIVE MG/DL
RBC #/AREA URNS HPF: NEGATIVE /HPF (ref 0–5)
SP GR UR REFRACTOMETRY: <1.005 (ref 1–1.03)
UROBILINOGEN UR QL STRIP.AUTO: 0.2 EU/DL (ref 0.2–1)
WBC URNS QL MICRO: NORMAL /HPF (ref 0–4)

## 2019-09-20 PROCEDURE — 96372 THER/PROPH/DIAG INJ SC/IM: CPT

## 2019-09-20 PROCEDURE — 74011000250 HC RX REV CODE- 250: Performed by: PHYSICIAN ASSISTANT

## 2019-09-20 PROCEDURE — 99283 EMERGENCY DEPT VISIT LOW MDM: CPT

## 2019-09-20 PROCEDURE — 81001 URINALYSIS AUTO W/SCOPE: CPT

## 2019-09-20 PROCEDURE — 87491 CHLMYD TRACH DNA AMP PROBE: CPT

## 2019-09-20 PROCEDURE — 74011250637 HC RX REV CODE- 250/637: Performed by: PHYSICIAN ASSISTANT

## 2019-09-20 PROCEDURE — 74011250636 HC RX REV CODE- 250/636: Performed by: PHYSICIAN ASSISTANT

## 2019-09-20 RX ORDER — AZITHROMYCIN 250 MG/1
1000 TABLET, FILM COATED ORAL
Status: COMPLETED | OUTPATIENT
Start: 2019-09-20 | End: 2019-09-20

## 2019-09-20 RX ADMIN — AZITHROMYCIN 1000 MG: 250 TABLET, FILM COATED ORAL at 12:47

## 2019-09-20 RX ADMIN — LIDOCAINE HYDROCHLORIDE 250 MG: 10 INJECTION, SOLUTION EPIDURAL; INFILTRATION; INTRACAUDAL; PERINEURAL at 12:48

## 2019-09-20 NOTE — ED PROVIDER NOTES
EMERGENCY DEPARTMENT HISTORY AND PHYSICAL EXAM    Date: 9/20/2019  Patient Name: Marcella Swenson    History of Presenting Illness     Chief Complaint   Patient presents with    Penile Discharge         History Provided By: Patient    Chief Complaint: penile discharte  Duration: 1 Weeks  Timing:  intermittent  Location:   Quality: none  Severity: N/A  Modifying Factors: none  Associated Symptoms: denies any other associated signs or symptoms      Additional History (Context): Marcella Swenson is a 57 y.o. male with No significant past medical history who presents with penile discharge for the past week. Denies any dysuria, abdominal pain, genital sores, nausea, vomiting, diarrhea, fever, chills or any other symptoms. He does participate in unprotected sexual intercourse. He is concerned about STD. He has no other symptoms or concerns    PCP: Mariana Gudino MD    Current Outpatient Medications   Medication Sig Dispense Refill    omeprazole (PRILOSEC) 20 mg capsule TAKE ONE CAPSULE BY MOUTH EVERY DAY 90 Cap 3    ergocalciferol (ERGOCALCIFEROL) 50,000 unit capsule Take 1 Cap by mouth every seven (7) days. 12 Cap 1       Past History     Past Medical History:  Past Medical History:   Diagnosis Date    GERD (gastroesophageal reflux disease)     GERD (gastroesophageal reflux disease)     Gonorrhea     Vitamin D deficiency        Past Surgical History:  Past Surgical History:   Procedure Laterality Date    HX HERNIA REPAIR Left        Family History:  Family History   Problem Relation Age of Onset    Diabetes Mother    Geary Benjamín Blindness Sister     Other Sister         menningitis       Social History:  Social History     Tobacco Use    Smoking status: Current Every Day Smoker     Packs/day: 0.50     Years: 20.00     Pack years: 10.00    Smokeless tobacco: Never Used   Substance Use Topics    Alcohol use: No    Drug use: No       Allergies:   Allergies   Allergen Reactions    Protonix [Pantoprazole] Palpitations         Review of Systems   Review of Systems   Constitutional: Negative for chills and fever. HENT: Negative. Respiratory: Negative. Cardiovascular: Negative. Genitourinary: Positive for discharge. Neurological: Negative. All other systems reviewed and are negative. All Other Systems Negative  Physical Exam     Vitals:    09/20/19 1226   BP: (!) 145/97   Pulse: 81   Resp: 18   Temp: 98.5 °F (36.9 °C)   SpO2: 99%   Weight: 84.8 kg (187 lb)   Height: 5' 8\" (1.727 m)     Physical Exam   Constitutional: He appears well-developed and well-nourished. No distress. HENT:   Head: Normocephalic and atraumatic. Right Ear: External ear normal.   Left Ear: External ear normal.   Mouth/Throat: Oropharynx is clear and moist.   Eyes: Conjunctivae are normal.   Neck: Normal range of motion. Neck supple. Genitourinary:   Genitourinary Comments: Patient deferred   Neurological: He is alert. Skin: Skin is warm and dry. He is not diaphoretic. Psychiatric: He has a normal mood and affect.           Diagnostic Study Results     Labs -     Recent Results (from the past 12 hour(s))   URINALYSIS W/ RFLX MICROSCOPIC    Collection Time: 09/20/19 12:28 PM   Result Value Ref Range    Color YELLOW      Appearance CLEAR      Specific gravity <1.005 (L) 1.005 - 1.030    pH (UA) 6.5 5.0 - 8.0      Protein NEGATIVE  NEG mg/dL    Glucose NEGATIVE  NEG mg/dL    Ketone NEGATIVE  NEG mg/dL    Bilirubin NEGATIVE  NEG      Blood NEGATIVE  NEG      Urobilinogen 0.2 0.2 - 1.0 EU/dL    Nitrites NEGATIVE  NEG      Leukocyte Esterase LARGE (A) NEG     URINE MICROSCOPIC ONLY    Collection Time: 09/20/19 12:28 PM   Result Value Ref Range    WBC 21 to 35 0 - 4 /hpf    RBC NEGATIVE  0 - 5 /hpf    Epithelial cells FEW 0 - 5 /lpf    Bacteria NEGATIVE  NEG /hpf       Radiologic Studies -   No orders to display     CT Results  (Last 48 hours)    None        CXR Results  (Last 48 hours)    None            Medical Decision Making I am the first provider for this patient. I reviewed the vital signs, available nursing notes, past medical history, past surgical history, family history and social history. Vital Signs-Reviewed the patient's vital signs. Pulse Oximetry Analysis - 99% on ra      Records Reviewed: Nursing Notes    Procedures:  Procedures    Provider Notes (Medical Decision Making): 61-year-old male complaining of penile discharge for the past week. No dysuria or abdominal pain. Requests STD treatment. VLAD [place] 1:37 PM        MED RECONCILIATION:  No current facility-administered medications for this encounter. Current Outpatient Medications   Medication Sig    omeprazole (PRILOSEC) 20 mg capsule TAKE ONE CAPSULE BY MOUTH EVERY DAY    ergocalciferol (ERGOCALCIFEROL) 50,000 unit capsule Take 1 Cap by mouth every seven (7) days. Disposition:  home    DISCHARGE NOTE:     Pt has been reexamined. Patient has no new complaints, changes, or physical findings. Care plan outlined and precautions discussed. Results of labs were reviewed with the patient. All medications were reviewed with the patient; will d/c home. All of pt's questions and concerns were addressed. Patient was instructed and agrees to follow up with pcp, as well as to return to the ED upon further deterioration. Patient is ready to go home. Follow-up Information     Follow up With Specialties Details Why Contact Info    Jennifer Jose MD [address] [address]  [address]    [address]  [phone]          Current Discharge Medication List            Diagnosis     Clinical Impression:   1.  Urethritis

## 2019-09-20 NOTE — ED TRIAGE NOTES
C/o penile discharge x one week. Denies rash to genital area or difficulty urinating. Voices concerns for possible STD.

## 2019-09-22 LAB
C TRACH RRNA SPEC QL NAA+PROBE: NEGATIVE
N GONORRHOEA RRNA SPEC QL NAA+PROBE: POSITIVE
SPECIMEN SOURCE: ABNORMAL
T VAGINALIS RRNA VAG QL NAA+PROBE: NEGATIVE

## 2019-09-23 ENCOUNTER — OFFICE VISIT (OUTPATIENT)
Dept: FAMILY MEDICINE CLINIC | Age: 49
End: 2019-09-23

## 2019-09-23 VITALS
DIASTOLIC BLOOD PRESSURE: 82 MMHG | SYSTOLIC BLOOD PRESSURE: 145 MMHG | HEART RATE: 90 BPM | WEIGHT: 176.6 LBS | RESPIRATION RATE: 20 BRPM | OXYGEN SATURATION: 99 % | BODY MASS INDEX: 26.76 KG/M2 | TEMPERATURE: 98 F | HEIGHT: 68 IN

## 2019-09-23 DIAGNOSIS — E66.3 OVERWEIGHT (BMI 25.0-29.9): ICD-10-CM

## 2019-09-23 DIAGNOSIS — A54.9 GONORRHEA IN MALE: ICD-10-CM

## 2019-09-23 DIAGNOSIS — R22.0 HEAD LUMP: Primary | ICD-10-CM

## 2019-09-23 DIAGNOSIS — Z13.220 SCREENING FOR LIPID DISORDERS: ICD-10-CM

## 2019-09-23 DIAGNOSIS — E55.9 VITAMIN D DEFICIENCY: ICD-10-CM

## 2019-09-23 DIAGNOSIS — Z83.3 FAMILY HISTORY OF DIABETES MELLITUS: ICD-10-CM

## 2019-09-23 DIAGNOSIS — K42.9 UMBILICAL HERNIA WITHOUT OBSTRUCTION AND WITHOUT GANGRENE: ICD-10-CM

## 2019-09-23 DIAGNOSIS — K21.9 GASTROESOPHAGEAL REFLUX DISEASE WITHOUT ESOPHAGITIS: ICD-10-CM

## 2019-09-23 RX ORDER — OMEPRAZOLE 20 MG/1
CAPSULE, DELAYED RELEASE ORAL
Qty: 90 CAP | Refills: 3 | Status: SHIPPED | OUTPATIENT
Start: 2019-09-23

## 2019-09-23 NOTE — PROGRESS NOTES
Chase Jones presents today for   Chief Complaint   Patient presents with    Nodule     back of head, notice it 3 weeks ago       Chase Jones preferred language for health care discussion is english/other. Is someone accompanying this pt? no    Is the patient using any DME equipment during [address]? no    Depression Screening:  3 most recent PHQ Screens 9/23/2019   Little interest or pleasure in doing things Not at all   Feeling down, depressed, irritable, or hopeless Not at all   Total Score PHQ 2 0       Learning Assessment:  Learning Assessment 9/23/2019   PRIMARY LEARNER Patient   HIGHEST LEVEL OF EDUCATION - PRIMARY LEARNER  DID NOT GRADUATE [facility] PRIMARY LEARNER NONE   CO-LEARNER CAREGIVER No   PRIMARY LANGUAGE ENGLISH    NEED No   LEARNER PREFERENCE PRIMARY DEMONSTRATION   ANSWERED BY patient   RELATIONSHIP SELF       Abuse Screening:  Abuse Screening Questionnaire 9/23/2019   Do you ever feel afraid of your partner? N   Are you in a relationship with someone who physically or mentally threatens you? N   Is it safe for you to go home? Y       Generalized Anxiety  No flowsheet data found. Health Maintenance Due   Topic Date Due    Pneumococcal 0-64 years (1 of 1 - PPSV23) 11/11/1978    Influenza Age 5 to Adult  08/01/2019   . Health Maintenance reviewed and discussed and ordered per Provider. Coordination of Care:  1. Have you been to the ER, urgent care clinic since your last visit? Hospitalized since your last visit? Yes, [facility]    2. Have you seen or consulted any other health care providers outside of the [address] [place] [address] since your last visit? Include any pap smears or colon screening. no      Advance Directive:  1. Do you have an advance directive in place? Patient Reply:no    2. If not, would you like material regarding how to put one in place?  Patient Reply: yes

## 2019-09-23 NOTE — PROGRESS NOTES
HPI  Pt presents with concern about nodule on back of left side of head. Says that it hs been there for about 3.5 weeks. Said that his neck was stiff in that area. 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 Said that his mom has diabetes and he is concerned about this. Says he needs Omeprazole refilled. Jerry Pimentel that he feels like he only needs this once in a while    Said that he was also supposed get umbilical hernia repaired but lost his insurance and wasn't able to do this. Says it still bothers him and feels that it hold him back and wishes to follow up with this    Also verbalized that he has been in ER last week for penile discharge and was waiting for lab results. Had been treated with Rocephin and Azithromycin and denies any further symptoms. Past Medical History  Past Medical History:   Diagnosis Date    GERD (gastroesophageal reflux disease)     GERD (gastroesophageal reflux disease)     Gonorrhea     Vitamin D deficiency        Surgical History  Past Surgical History:   Procedure Laterality Date    HX HERNIA REPAIR Left         Medications  Current Outpatient Medications   Medication Sig Dispense Refill    omeprazole (PRILOSEC) 20 mg capsule TAKE ONE CAPSULE BY MOUTH EVERY DAY 90 Cap 3    ergocalciferol (ERGOCALCIFEROL) 50,000 unit capsule Take 1 Cap by mouth every seven (7) days.  12 Cap 1       Allergies  Allergies   Allergen Reactions    Protonix [Pantoprazole] Palpitations       Family History  Family History   Problem Relation Age of Onset    Diabetes Mother    [facility] Blindness Sister     Other Sister         menningitis       Social History  Social History     Socioeconomic History    Marital status: SINGLE     Spouse name: Not on file    Number of children: Not on file    Years of education: Not on file    Highest education level: Not on file   Occupational History    Not on file   Social Needs    Financial resource strain: Not on file    Food insecurity:     Worry: Not on file     Inability: Not on file    Transportation needs:     Medical: Not on file     Non-medical: Not on file   Tobacco Use    Smoking status: Current Every Day Smoker     Packs/day: 0.50     Years: 20.00     Pack years: 10.00    Smokeless tobacco: Never Used   Substance and Sexual Activity    Alcohol use: No    Drug use: No    Sexual activity: Yes     Partners: Female   Lifestyle    Physical activity:     Days per week: Not on file     Minutes per session: Not on file    Stress: Not on file   Relationships    Social connections:     Talks on phone: Not on file     Gets together: Not on file     Attends [employer] service: Not on file     Active member of club or organization: Not on file     Attends meetings of clubs or organizations: Not on file     Relationship status: Not on file    Intimate partner violence:     Fear of current or ex partner: Not on file     Emotionally abused: Not on file     Physically abused: Not on file     Forced sexual activity: Not on file   Other Topics Concern    Not on file   Social History Narrative    Not on file       Problem List  Patient Active Problem List   Diagnosis Code    Gastroesophageal reflux disease without esophagitis K21.9    Left inguinal hernia L86.31    Umbilical hernia without obstruction and without gangrene K42.9    Vitamin D deficiency E55.9    Carpal tunnel syndrome of right wrist G56.01    Family history of diabetes mellitus Z83.3    Head lump R22.0    Overweight (BMI 25.0-29. 9) E66.3    Screening for lipid disorders Z13.220    Gonorrhea in male A54.9       Review of Systems  Review of Systems   Constitutional: Negative. Respiratory: Negative. Cardiovascular: Negative. Gastrointestinal: Negative. Endo/Heme/Allergies: Negative.         Vital Signs  Vitals:    09/23/19 1017   BP: 145/82   Pulse: 90   Resp: 20   Temp: 98 °F (36.7 °C)   TempSrc: Oral   SpO2: 99%   Weight: 176 lb 9.6 oz (80.1 kg)   Height: 5' 8\" (1.727 m)   PainSc:   0 - No pain       Physical Exam  Physical Exam   Constitutional: He is oriented to person, place, and time. He appears well-developed and well-nourished. No distress. Neck: Normal range of motion. Neck supple. Cardiovascular: Normal rate, regular rhythm and normal heart sounds. Pulmonary/Chest: Effort normal and breath sounds normal. No respiratory distress. Abdominal: Soft. Bowel sounds are normal. He exhibits no distension. There is no tenderness. A hernia is present. Reducible umbilical hernia, no pain upon palpation   Musculoskeletal: Normal range of motion. Lymphadenopathy:     He has no cervical adenopathy. Neurological: He is alert and oriented to person, place, and time. Skin: Skin is warm and dry. Left occipital region- single oval shaped moveable grape sizes mass. Non-tender   Psychiatric: He has a normal mood and affect. Nursing note and vitals reviewed.       Diagnostics  Orders Placed This Encounter    CBC WITH AUTOMATED DIFF     Standing Status:   Future     Standing Expiration Date:   9/23/2020    LIPID PANEL     Standing Status:   Future     Standing Expiration Date:   3/62/7271    METABOLIC PANEL, COMPREHENSIVE     Standing Status:   Future     Standing Expiration Date:   9/23/2020    TSH 3RD GENERATION     Standing Status:   Future     Standing Expiration Date:   9/23/2020    HEMOGLOBIN A1C WITH EAG     Standing Status:   Future     Standing Expiration Date:   9/23/2020    VITAMIN D, 25 HYDROXY     Standing Status:   Future     Standing Expiration Date:   9/22/2020    REFERRAL TO GENERAL SURGERY     Referral Priority:   Routine     Referral Type:   Consultation     Referral Reason:   Specialty Services Required     Referred to Provider:   Cristobal Oneal MD    omeprazole (PRILOSEC) 20 mg capsule     Sig: TAKE ONE CAPSULE BY MOUTH EVERY DAY     Dispense:  90 Cap     Refill:  3       Results  Results for orders placed or performed during the hospital encounter of 09/20/19   URINALYSIS W/ RFLX MICROSCOPIC   Result Value Ref Range    Color YELLOW      Appearance CLEAR      Specific gravity <1.005 (L) 1.005 - 1.030    pH (UA) 6.5 5.0 - 8.0      Protein NEGATIVE  NEG mg/dL    Glucose NEGATIVE  NEG mg/dL    Ketone NEGATIVE  NEG mg/dL    Bilirubin NEGATIVE  NEG      Blood NEGATIVE  NEG      Urobilinogen 0.2 0.2 - 1.0 EU/dL    Nitrites NEGATIVE  NEG      Leukocyte Esterase LARGE (A) NEG     URINE MICROSCOPIC ONLY   Result Value Ref Range    WBC 21 to 35 0 - 4 /hpf    RBC NEGATIVE  0 - 5 /hpf    Epithelial cells FEW 0 - 5 /lpf    Bacteria NEGATIVE  NEG /hpf   CT/NG/T.VAGINALIS AMPLIFICATION   Result Value Ref Range    Source URINE      C. trachomatis by ELIZABETH NEGATIVE  NEGATIVE      N. gonorrhoeae by ELIZABETH Positive (A) NEGATIVE      T. vaginalis by ELIZABETH NEGATIVE  NEGATIVE         Assessment and Plan  Diagnoses and all orders for this visit:    1. Head lump  Discussed with patient that this seems like a cyst.  Very soft and nontender. Discussed that we can watch it and for him to pay attention to any concerning changes. To follow-up in 1 month    2. Gastroesophageal reflux disease without esophagitis  -     CBC WITH AUTOMATED DIFF; Future  -     METABOLIC PANEL, COMPREHENSIVE; Future  -     omeprazole (PRILOSEC) 20 mg capsule; TAKE ONE CAPSULE BY MOUTH EVERY DAY    3. Umbilical hernia without obstruction and without gangrene  -     REFERRAL TO GENERAL SURGERY    4. Overweight (BMI 25.0-29.9)  -     TSH 3RD GENERATION; Future    5. Vitamin D deficiency  -     VITAMIN D, 25 HYDROXY; Future    6. Family history of diabetes mellitus  -     HEMOGLOBIN A1C WITH EAG; Future    7. Screening for lipid disorders  -     LIPID PANEL; Future    8. Gonorrhea in male  Upon review of the chart noted that patient was positive for gonorrhea. He was treated for this and the emergency room.   Attempted to call patient and notify him but was unable to him by telephone. Left a message for him to call the office back      After care summary printed and reviewed with patient. Plan reviewed with patient. Questions answered. Patient verbalized understanding of plan and is in agreement with plan. Patient to follow up in one month or earlier if symptoms worsen. Encouraged the use of my chart.     Yoni Mora, FNP-C

## 2019-09-24 ENCOUNTER — HOSPITAL ENCOUNTER (OUTPATIENT)
Dept: LAB | Age: 49
Discharge: HOME | End: 2019-09-24
Payer: SUBSIDIZED

## 2019-09-24 DIAGNOSIS — E55.9 VITAMIN D DEFICIENCY: ICD-10-CM

## 2019-09-24 DIAGNOSIS — K21.9 GASTROESOPHAGEAL REFLUX DISEASE WITHOUT ESOPHAGITIS: ICD-10-CM

## 2019-09-24 DIAGNOSIS — E66.3 OVERWEIGHT (BMI 25.0-29.9): ICD-10-CM

## 2019-09-24 DIAGNOSIS — Z83.3 FAMILY HISTORY OF DIABETES MELLITUS: ICD-10-CM

## 2019-09-24 DIAGNOSIS — Z13.220 SCREENING FOR LIPID DISORDERS: ICD-10-CM

## 2019-09-24 LAB
25(OH)D3 SERPL-MCNC: 19.7 NG/ML (ref 30–100)
ALBUMIN SERPL-MCNC: 3.5 G/DL (ref 3.4–5)
ALBUMIN/GLOB SERPL: 1.1 {RATIO} (ref 0.8–1.7)
ALP SERPL-CCNC: 99 U/L (ref 45–117)
ALT SERPL-CCNC: 22 U/L (ref 16–61)
ANION GAP SERPL CALC-SCNC: 6 MMOL/L (ref 3–18)
AST SERPL-CCNC: 14 U/L (ref 10–38)
BASOPHILS # BLD: 0 K/UL (ref 0–0.1)
BASOPHILS NFR BLD: 0 % (ref 0–2)
BILIRUB SERPL-MCNC: 0.4 MG/DL (ref 0.2–1)
BUN SERPL-MCNC: 10 MG/DL (ref 7–18)
BUN/CREAT SERPL: 11 (ref 12–20)
CALCIUM SERPL-MCNC: 8.6 MG/DL (ref 8.5–10.1)
CHLORIDE SERPL-SCNC: 109 MMOL/L (ref 100–111)
CHOLEST SERPL-MCNC: 134 MG/DL
CO2 SERPL-SCNC: 29 MMOL/L (ref 21–32)
CREAT SERPL-MCNC: 0.93 MG/DL (ref 0.6–1.3)
DIFFERENTIAL METHOD BLD: ABNORMAL
EOSINOPHIL # BLD: 0.6 K/UL (ref 0–0.4)
EOSINOPHIL NFR BLD: 9 % (ref 0–5)
ERYTHROCYTE [DISTWIDTH] IN BLOOD BY AUTOMATED COUNT: 12.3 % (ref 11.6–14.5)
EST. AVERAGE GLUCOSE BLD GHB EST-MCNC: 108 MG/DL
GLOBULIN SER CALC-MCNC: 3.2 G/DL (ref 2–4)
GLUCOSE SERPL-MCNC: 98 MG/DL (ref 74–99)
HBA1C MFR BLD: 5.4 % (ref 4.2–5.6)
HCT VFR BLD AUTO: 42.5 % (ref 36–48)
HDLC SERPL-MCNC: 70 MG/DL (ref 40–60)
HDLC SERPL: 1.9 {RATIO} (ref 0–5)
HGB BLD-MCNC: 14.2 G/DL (ref 13–16)
LDLC SERPL CALC-MCNC: 51 MG/DL (ref 0–100)
LIPID PROFILE,FLP: ABNORMAL
LYMPHOCYTES # BLD: 2.7 K/UL (ref 0.9–3.6)
LYMPHOCYTES NFR BLD: 47 % (ref 21–52)
MCH RBC QN AUTO: 30.5 PG (ref 24–34)
MCHC RBC AUTO-ENTMCNC: 33.4 G/DL (ref 31–37)
MCV RBC AUTO: 91.4 FL (ref 74–97)
MONOCYTES # BLD: 0.7 K/UL (ref 0.05–1.2)
MONOCYTES NFR BLD: 11 % (ref 3–10)
NEUTS SEG # BLD: 2 K/UL (ref 1.8–8)
NEUTS SEG NFR BLD: 33 % (ref 40–73)
PLATELET # BLD AUTO: 251 K/UL (ref 135–420)
PMV BLD AUTO: 10.8 FL (ref 9.2–11.8)
POTASSIUM SERPL-SCNC: 4.5 MMOL/L (ref 3.5–5.5)
PROT SERPL-MCNC: 6.7 G/DL (ref 6.4–8.2)
RBC # BLD AUTO: 4.65 M/UL (ref 4.7–5.5)
SODIUM SERPL-SCNC: 144 MMOL/L (ref 136–145)
TRIGL SERPL-MCNC: 65 MG/DL (ref ?–150)
TSH SERPL DL<=0.05 MIU/L-ACNC: 1.01 UIU/ML (ref 0.36–3.74)
VLDLC SERPL CALC-MCNC: 13 MG/DL
WBC # BLD AUTO: 5.9 K/UL (ref 4.6–13.2)

## 2019-09-24 PROCEDURE — 82306 VITAMIN D 25 HYDROXY: CPT

## 2019-09-24 PROCEDURE — 36415 COLL VENOUS BLD VENIPUNCTURE: CPT

## 2019-09-24 PROCEDURE — 80061 LIPID PANEL: CPT

## 2019-09-24 PROCEDURE — 80053 COMPREHEN METABOLIC PANEL: CPT

## 2019-09-24 PROCEDURE — 84443 ASSAY THYROID STIM HORMONE: CPT

## 2019-09-24 PROCEDURE — 83036 HEMOGLOBIN GLYCOSYLATED A1C: CPT

## 2019-09-24 PROCEDURE — 85025 COMPLETE CBC W/AUTO DIFF WBC: CPT

## 2019-09-25 ENCOUNTER — TELEPHONE (OUTPATIENT)
Dept: FAMILY MEDICINE CLINIC | Age: 49
End: 2019-09-25

## 2019-09-25 DIAGNOSIS — E55.9 VITAMIN D DEFICIENCY: ICD-10-CM

## 2019-09-25 RX ORDER — ERGOCALCIFEROL 1.25 MG/1
50000 CAPSULE ORAL
Qty: 12 CAP | Refills: 1 | Status: SHIPPED | OUTPATIENT
Start: 2019-09-25

## 2019-09-25 NOTE — TELEPHONE ENCOUNTER
Called pt to discuss lab results. Also reviewed with him that he was positive for gonorrhea but had been treated for it in the ER. Encouraged pt to discuss this diagnosis with his partner so that she could be treated. Reviewed signs/symptoms/transmission of STDs.   Pt verbalized understanding of this

## 2019-10-23 PROBLEM — Z13.220 SCREENING FOR LIPID DISORDERS: Status: RESOLVED | Noted: 2019-09-23 | Resolved: 2019-10-23
